# Patient Record
Sex: FEMALE | Race: BLACK OR AFRICAN AMERICAN | Employment: FULL TIME | RURAL
[De-identification: names, ages, dates, MRNs, and addresses within clinical notes are randomized per-mention and may not be internally consistent; named-entity substitution may affect disease eponyms.]

---

## 2017-07-10 ENCOUNTER — OFFICE VISIT (OUTPATIENT)
Dept: FAMILY MEDICINE CLINIC | Age: 57
End: 2017-07-10

## 2017-07-10 VITALS
BODY MASS INDEX: 29.74 KG/M2 | SYSTOLIC BLOOD PRESSURE: 138 MMHG | DIASTOLIC BLOOD PRESSURE: 78 MMHG | TEMPERATURE: 98.5 F | OXYGEN SATURATION: 99 % | HEART RATE: 94 BPM | WEIGHT: 160 LBS

## 2017-07-10 DIAGNOSIS — E78.00 HYPERCHOLESTEROLEMIA: ICD-10-CM

## 2017-07-10 DIAGNOSIS — Z12.11 SCREEN FOR COLON CANCER: ICD-10-CM

## 2017-07-10 DIAGNOSIS — Z00.00 ROUTINE GENERAL MEDICAL EXAMINATION AT A HEALTH CARE FACILITY: Primary | ICD-10-CM

## 2017-07-10 DIAGNOSIS — Z11.59 ENCOUNTER FOR HEPATITIS C SCREENING TEST FOR LOW RISK PATIENT: ICD-10-CM

## 2017-07-10 DIAGNOSIS — J30.9 ALLERGIC RHINITIS, UNSPECIFIED ALLERGIC RHINITIS TRIGGER, UNSPECIFIED RHINITIS SEASONALITY: ICD-10-CM

## 2017-07-10 DIAGNOSIS — Z12.31 ENCOUNTER FOR SCREENING MAMMOGRAM FOR BREAST CANCER: ICD-10-CM

## 2017-07-10 DIAGNOSIS — I10 ESSENTIAL HYPERTENSION WITH GOAL BLOOD PRESSURE LESS THAN 140/90: ICD-10-CM

## 2017-07-10 DIAGNOSIS — F41.9 ANXIETY: ICD-10-CM

## 2017-07-10 DIAGNOSIS — Z12.31 ENCOUNTER FOR MAMMOGRAM TO ESTABLISH BASELINE MAMMOGRAM: ICD-10-CM

## 2017-07-10 RX ORDER — ATORVASTATIN CALCIUM 20 MG/1
TABLET, FILM COATED ORAL
Qty: 90 TAB | Refills: 1 | Status: SHIPPED | OUTPATIENT
Start: 2017-07-10 | End: 2018-02-06 | Stop reason: SDUPTHER

## 2017-07-10 RX ORDER — FLUTICASONE PROPIONATE 50 MCG
2 SPRAY, SUSPENSION (ML) NASAL DAILY
Qty: 1 BOTTLE | Refills: 6 | Status: SHIPPED | OUTPATIENT
Start: 2017-07-10 | End: 2017-10-16

## 2017-07-10 RX ORDER — LOSARTAN POTASSIUM AND HYDROCHLOROTHIAZIDE 12.5; 5 MG/1; MG/1
TABLET ORAL
Qty: 90 TAB | Refills: 1 | Status: SHIPPED | OUTPATIENT
Start: 2017-07-10 | End: 2017-09-08 | Stop reason: SDUPTHER

## 2017-07-10 RX ORDER — LORAZEPAM 1 MG/1
TABLET ORAL
Qty: 60 TAB | Refills: 0 | Status: SHIPPED | OUTPATIENT
Start: 2017-07-10 | End: 2018-03-27 | Stop reason: SDUPTHER

## 2017-07-10 NOTE — MR AVS SNAPSHOT
Visit Information Date & Time Provider Department Dept. Phone Encounter #  
 7/10/2017  8:00 AM [facility] [phone] [id number] Follow-up Instructions Return in about 6 months (around 1/10/2018). Follow-up and Disposition History Upcoming Health Maintenance Date Due Hepatitis C Screening 1960 PAP AKA CERVICAL CYTOLOGY 10/18/2016 FOBT Q 1 YEAR AGE 50-75 8/9/2017 INFLUENZA AGE 9 TO ADULT 8/1/2017 BREAST CANCER SCRN MAMMOGRAM 9/15/2018 DTaP/Tdap/Td series (2 - Td) 3/3/2019 Allergies as of 7/10/2017  Review Complete On: 7/10/2017 By: Angel Meyer MD  
 No Known Allergies Current Immunizations  Never Reviewed Name Date Influenza Vaccine 1/8/2001 Tdap 3/3/2009 Not reviewed this visit You Were Diagnosed With   
  
 Codes Comments Routine general medical examination at a health care facility    -  Primary ICD-10-CM: Z00.00 ICD-9-CM: V70.0 Essential hypertension with goal blood pressure less than 140/90     ICD-10-CM: I10 
ICD-9-CM: 401.9 Hypercholesterolemia     ICD-10-CM: E78.00 ICD-9-CM: 272.0 Encounter for mammogram to establish baseline mammogram     ICD-10-CM: Z12.31 
ICD-9-CM: V76.12 Anxiety     ICD-10-CM: F41.9 ICD-9-CM: 300.00 Encounter for hepatitis C screening test for low risk patient     ICD-10-CM: Z11.59 
ICD-9-CM: V73.89 Screen for colon cancer     ICD-10-CM: Z12.11 ICD-9-CM: V76.51 Encounter for screening mammogram for breast cancer     ICD-10-CM: Z12.31 
ICD-9-CM: V76.12 Allergic rhinitis, unspecified allergic rhinitis trigger, unspecified rhinitis seasonality     ICD-10-CM: J30.9 ICD-9-CM: 477.9 Vitals BP Pulse Temp Weight(growth percentile) SpO2 BMI  
 138/78 94 98.5 °F (36.9 °C) 160 lb (72.6 kg) 99% 29.74 kg/m2 OB Status Smoking Status Postmenopausal Never Smoker BMI and BSA Data Body Mass Index Body Surface Area  
 29.74 kg/m 2 1.77 m 2 Preferred Pharmacy Pharmacy Name Phone [facility] #[id number] Ingram [facility] [address] [place] [phone] Your Updated Medication List  
  
   
This list is accurate as of: 7/10/17  8:55 AM.  Always use your most recent med list.  
  
  
  
  
 atorvastatin 20 mg tablet Commonly known as:  LIPITOR  
TAKE 1 TAB BY MOUTH DAILY. FISH OIL 1,000 mg Cap Generic drug:  omega-3 fatty acids-vitamin e Take 1 capsule by mouth. 1 qod  
  
 fluticasone 50 mcg/actuation nasal spray Commonly known as:  [employer] 2 Sprays by Both Nostrils route daily. LORazepam 1 mg tablet Commonly known as:  ATIVAN One daily if needed  
  
 losartan-hydroCHLOROthiazide 50-12.5 mg per tablet Commonly known as:  HYZAAR  
TAKE 1 TABLET BY MOUTH EVERY DAY Prescriptions Printed Refills LORazepam (ATIVAN) 1 mg tablet 0 Sig: One daily if needed Class: Print Prescriptions Sent to Pharmacy Refills  
 atorvastatin (LIPITOR) 20 mg tablet 1 Sig: TAKE 1 TAB BY MOUTH DAILY. Class: Normal  
 Pharmacy: [facility]/pharmacy #[id number] - Saniya Cordobas - [address] Ph #: [phone]  
 fluticasone (FLONASE) 50 mcg/actuation nasal spray 6 Si Sprays by Both Nostrils route daily. Class: Normal  
 Pharmacy: [facility]/pharmacy #[id number] Mani ConcepcionSaumya [facility] [address] [place] Ph #: [phone] Route: Both Nostrils  
 losartan-hydroCHLOROthiazide (HYZAAR) 50-12.5 mg per tablet 1 Sig: TAKE 1 TABLET BY MOUTH EVERY DAY Class: Normal  
 Pharmacy: [facility]/pharmacy #[id number] Mani ConcepcionSaumya [facility] [address] Ph #: [phone] We Performed the Following ALT B101967 CPT(R)] AST [id number] CPT(R)] CBC WITH AUTOMATED DIFF [50394 CPT(R)] HEPATITIS C AB [33373 CPT(R)] LIPID PANEL [46836 CPT(R)] METABOLIC PANEL, BASIC [94153 CPT(R)] PAP LB, HPV-H+LR([id number]) [[id number] Custom] URINALYSIS W/ RFLX MICROSCOPIC [05652 CPT(R)] Follow-up Instructions Return in about 6 months (around 1/10/2018). To-Do List   
 07/10/2017 Lab:  OCCULT BLOOD, IMMUNOASSAY (FIT) Around 09/18/2017 Imaging:  ETTA MAMMO BI SCREENING INCL CAD Introducing [facility] & HEALTH SERVICES! Edou[facility] Kaplan introduces [email or web address] patient portal. Now you can access parts of your medical record, email your doctor's office, and request medication refills online. 1. In your internet browser, go to [email or web address] 2. Click on the First Time User? Click Here link in the Sign In box. You will see the New Member Sign Up page. 3. Enter your [email or web address] Access Code exactly as it appears below. You will not need to use this code after youve completed the sign-up process. If you do not sign up before the expiration date, you must request a new code. · [email or web address] Access Code: [email or web address] Expires: 10/8/2017  8:30 AM 
 
4. Enter the last four digits of your Social Security Number (xxxx) and Date of Birth (mm/dd/yyyy) as indicated and click Submit. You will be taken to the next sign-up page. 5. Create a [email or web address] ID. This will be your [email or web address] login ID and cannot be changed, so think of one that is secure and easy to remember. 6. Create a [email or web address] password. You can change your password at any time. 7. Enter your Password Reset Question and Answer. This can be used at a later time if you forget your password. 8. Enter your e-mail address. You will receive e-mail notification when new information is available in [address] 9. Click Sign Up. You can now view and download portions of your medical record. 10. Click the Download Summary menu link to download a portable copy of your medical information. If you have questions, please visit the Frequently Asked Questions section of the [email or web address] website.  Remember, [email or web address] is NOT to be used for urgent needs. For medical emergencies, dial 911. Now available from your iPhone and Android! Please provide this summary of care documentation to your next provider. Your primary care clinician is listed as Genesis Garcia. If you have any questions after today's visit, please call [phone].

## 2017-07-10 NOTE — PROGRESS NOTES
Daisey Oppenheim is a 59 y.o. female who presents to the office today with the following:  Chief Complaint   Patient presents with    Annual Wellness Visit       No Known Allergies    Current Outpatient Prescriptions   Medication Sig    atorvastatin (LIPITOR) 20 mg tablet TAKE 1 TAB BY MOUTH DAILY.  fluticasone (FLONASE) 50 mcg/actuation nasal spray 2 Sprays by Both Nostrils route daily.  losartan-hydroCHLOROthiazide (HYZAAR) 50-12.5 mg per tablet TAKE 1 TABLET BY MOUTH EVERY DAY    LORazepam (ATIVAN) 1 mg tablet One daily if needed    omega-3 fatty acids-vitamin e (FISH OIL) 1,000 mg cap Take 1 capsule by mouth. 1 qod     No current facility-administered medications for this visit. Past Medical History:   Diagnosis Date    Allergic rhinitis     Anemia     blood loss    Hypercholesterolemia     Hypertension     Irregular menses        Past Surgical History:   Procedure Laterality Date    HX  SECTION      HX TUBAL LIGATION         History   Smoking Status    Never Smoker   Smokeless Tobacco    Never Used       Family History   Problem Relation Age of Onset    Hypertension Mother     Diabetes Mother    [facility] [address] Stroke Father     Hypertension Father     Diabetes Father     Arrhythmia Father      A fib    Hypertension Sister     Hypertension Brother     Diabetes Brother     Diabetes Maternal Grandmother     Cancer Paternal Grandmother      Lung--smoker         History of Present Illness:  Patient here for routine annual wellness visit, Pap smear and ongoing medical follow-up    Patient is  3 para 1 female. Postmenopausal without bleeding or spotting. Previous Pap smears normal.  Previous GYN history negative. No current GYN complaints. Here for routine breast exam.  No breast complaints. Last mammogram 2016. She does get mammograms annually. No first-degree relatives with breast cancer    Patient has hypertension. Controlled on her current regiment.   No cardiac complaints. Most recent EKG normal.  Most recent basic metabolic profile normal    Patient with hyperlipidemia. Well controlled on current regimen. Most recent cholesterol 160 with normal LFTs. Repeat lab work ordered. Patient does have a history of anxiety. She has been on as needed lorazepam for a number of years. Last year Celexa was added in. She is opted not to take that medication. Overall her symptoms have improved. She uses the Ativan only rarely. She did receive 60 tablets in November 2016 and is just now asking for a refill.  was reviewed. She is not feeling depressed. Patient with allergic rhinitis. Uses as needed Flonase. This was refilled. Mild increase in BMI. We discussed the importance of diet and exercise. Patient does not do colonoscopies. Fit test -2016. Reordered. No family history of colon issues. Patient to be screened for hepatitis C    She is declining flu shot and Pneumovax    Review of Systems:  Review of systems negative except as noted above        Physical Exam:  Visit Vitals    /78    Pulse 94    Temp 98.5 °F (36.9 °C)    Wt 160 lb (72.6 kg)    SpO2 99%    BMI 29.74 kg/m2     Vitals:    07/10/17 0849   BP: 138/78   Pulse: 94   Temp: 98.5 °F (36.9 °C)   SpO2: 99%   Weight: 160 lb (72.6 kg)    Patient no acute distress vital signs stable as above  Head is normocephalic  Affect. Patient seems somewhat nervous but not really anxious. Good eye contact. External ears normal.  Hearing grossly normal  Eyes PERRLA. EOMs full. Sclera clear. Patient does see her eye doctor regularly  Nose within normal limits. Nares  normal.  No significant congestion  OP mucosa normal, no obvious lesions. Pharynx normal.  No erythema or exudate. Structures midline. .  Patient does follow with her dentist regularly  Neck no nodes no masses no bruits  Chest was clear no wheezes rhonchi or rales.   Good air exchange  Cor regular rate and rhythm no S3-S4 no murmurs  Breast exam: No masses dimpling or discharge  Abdomen no HSM no masses soft and was nontender  Extremities no edema. Nontender. Good range of motion  GYN exam: Normal external female genitalia  Vaginal exam was normal without discharge or lesions  Cervix appeared normal no lesions or bleeding  Bimanual exam was normal  Adnexa normal  Rectovaginal exam normal  Gait and gross neurologic exam were normal      1. Routine general medical examination at a health care facility  Healthy-appearing female. Overall medically stable. She is can continue her current medications. Tests as noted below. Medications refilled. She will be back in 6 months for follow-up sooner if needed  - PAP LB, HPV-H+LR([id number])    2. Essential hypertension with goal blood pressure less than 140/90    - losartan-hydroCHLOROthiazide (HYZAAR) 50-12.5 mg per tablet; TAKE 1 TABLET BY MOUTH EVERY DAY  Dispense: 90 Tab; Refill: 1  - CBC WITH AUTOMATED DIFF  - URINALYSIS W/ RFLX MICROSCOPIC  - METABOLIC PANEL, BASIC    3. Hypercholesterolemia    - atorvastatin (LIPITOR) 20 mg tablet; TAKE 1 TAB BY MOUTH DAILY. Dispense: 90 Tab; Refill: 1  - LIPID PANEL  - AST  - ALT      5. Anxiety    - LORazepam (ATIVAN) 1 mg tablet; One daily if needed  Dispense: 60 Tab; Refill: 0    6. Encounter for hepatitis C screening test for low risk patient    - HEPATITIS C AB    7. Screen for colon cancer    - OCCULT BLOOD, IMMUNOASSAY (FIT); Future    8. Encounter for screening mammogram for breast cancer    - ETTA MAMMO BI SCREENING INCL CAD; Future    9. Allergic rhinitis, unspecified allergic rhinitis trigger, unspecified rhinitis seasonality    - fluticasone (FLONASE) 50 mcg/actuation nasal spray; 2 Sprays by Both Nostrils route daily. Dispense: 1 Bottle; Refill: 6      Continue current therapy plan except for indicated above.  Verbal and written instructions (see AVS) provided.  Patient expresses understanding of diagnosis and treatment plan.    Follow-up Disposition:  Return in about 6 months (around 1/10/2018). Nik Perry.  Cameron Fabian MD

## 2017-07-11 LAB
ALT SERPL-CCNC: 12 IU/L (ref 0–32)
APPEARANCE UR: CLEAR
AST SERPL-CCNC: 11 IU/L (ref 0–40)
BACTERIA #/AREA URNS HPF: NORMAL /[HPF]
BASOPHILS # BLD AUTO: 0 X10E3/UL (ref 0–0.2)
BASOPHILS NFR BLD AUTO: 1 %
BILIRUB UR QL STRIP: NEGATIVE
BUN SERPL-MCNC: 11 MG/DL (ref 6–24)
BUN/CREAT SERPL: 17 (ref 9–23)
CALCIUM SERPL-MCNC: 9.6 MG/DL (ref 8.7–10.2)
CASTS URNS QL MICRO: NORMAL /LPF
CHLORIDE SERPL-SCNC: 99 MMOL/L (ref 96–106)
CHOLEST SERPL-MCNC: 190 MG/DL (ref 100–199)
CO2 SERPL-SCNC: 22 MMOL/L (ref 18–29)
COLOR UR: YELLOW
CREAT SERPL-MCNC: 0.66 MG/DL (ref 0.57–1)
EOSINOPHIL # BLD AUTO: 0 X10E3/UL (ref 0–0.4)
EOSINOPHIL NFR BLD AUTO: 1 %
EPI CELLS #/AREA URNS HPF: NORMAL /HPF
ERYTHROCYTE [DISTWIDTH] IN BLOOD BY AUTOMATED COUNT: 14.4 % (ref 12.3–15.4)
GLUCOSE SERPL-MCNC: 86 MG/DL (ref 65–99)
GLUCOSE UR QL: NEGATIVE
HCT VFR BLD AUTO: 38.5 % (ref 34–46.6)
HCV AB S/CO SERPL IA: <0.1 S/CO RATIO (ref 0–0.9)
HDLC SERPL-MCNC: 62 MG/DL
HGB BLD-MCNC: 12.4 G/DL (ref 11.1–15.9)
HGB UR QL STRIP: NEGATIVE
IMM GRANULOCYTES # BLD: 0 X10E3/UL (ref 0–0.1)
IMM GRANULOCYTES NFR BLD: 0 %
KETONES UR QL STRIP: NEGATIVE
LDLC SERPL CALC-MCNC: 111 MG/DL (ref 0–99)
LEUKOCYTE ESTERASE UR QL STRIP: ABNORMAL
LYMPHOCYTES # BLD AUTO: 1.5 X10E3/UL (ref 0.7–3.1)
LYMPHOCYTES NFR BLD AUTO: 37 %
MCH RBC QN AUTO: 25.2 PG (ref 26.6–33)
MCHC RBC AUTO-ENTMCNC: 32.2 G/DL (ref 31.5–35.7)
MCV RBC AUTO: 78 FL (ref 79–97)
MICRO URNS: ABNORMAL
MONOCYTES # BLD AUTO: 0.5 X10E3/UL (ref 0.1–0.9)
MONOCYTES NFR BLD AUTO: 12 %
MUCOUS THREADS URNS QL MICRO: PRESENT
NEUTROPHILS # BLD AUTO: 2.1 X10E3/UL (ref 1.4–7)
NEUTROPHILS NFR BLD AUTO: 49 %
NITRITE UR QL STRIP: NEGATIVE
PH UR STRIP: 7 [PH] (ref 5–7.5)
PLATELET # BLD AUTO: 235 X10E3/UL (ref 150–379)
POTASSIUM SERPL-SCNC: 4.1 MMOL/L (ref 3.5–5.2)
PROT UR QL STRIP: NEGATIVE
RBC # BLD AUTO: 4.92 X10E6/UL (ref 3.77–5.28)
RBC #/AREA URNS HPF: NORMAL /HPF
SODIUM SERPL-SCNC: 144 MMOL/L (ref 134–144)
SP GR UR: 1.02 (ref 1–1.03)
TRIGL SERPL-MCNC: 86 MG/DL (ref 0–149)
UROBILINOGEN UR STRIP-MCNC: 1 MG/DL (ref 0.2–1)
VLDLC SERPL CALC-MCNC: 17 MG/DL (ref 5–40)
WBC # BLD AUTO: 4.2 X10E3/UL (ref 3.4–10.8)
WBC #/AREA URNS HPF: NORMAL /HPF

## 2017-07-11 NOTE — PROGRESS NOTES
Recent lab work reviewed  All lab tests look good, within acceptable limits except cholesterol a bit higher at 190  Patient should continue the current medications  Work harder on her low-cholesterol diet    Keep planned follow-up

## 2017-07-14 LAB
CYTOLOGIST CVX/VAG CYTO: NORMAL
DX ICD CODE: NORMAL
HPV I/H RISK 1 DNA CVX QL PROBE+SIG AMP: NEGATIVE
HPV LOW RISK DNA CVX QL PROBE+SIG AMP: NEGATIVE
Lab: NORMAL
OTHER STN SPEC: NORMAL
PATH REPORT.FINAL DX SPEC: NORMAL
STAT OF ADQ CVX/VAG CYTO-IMP: NORMAL

## 2017-07-17 NOTE — PROGRESS NOTES
Patient's Pap smear came back normal  She should continue her current medications and keep her planned follow-up

## 2017-09-08 DIAGNOSIS — I10 ESSENTIAL HYPERTENSION WITH GOAL BLOOD PRESSURE LESS THAN 140/90: ICD-10-CM

## 2017-09-08 RX ORDER — LOSARTAN POTASSIUM AND HYDROCHLOROTHIAZIDE 12.5; 5 MG/1; MG/1
TABLET ORAL
Qty: 90 TAB | Refills: 2 | OUTPATIENT
Start: 2017-09-08 | End: 2019-07-30

## 2017-09-08 NOTE — TELEPHONE ENCOUNTER
There is no request for a BP medication for this patient. Please refill generic Hyzaar. Next F/U per Dr. Karli Breaux isn't until January 2018.

## 2017-09-08 NOTE — TELEPHONE ENCOUNTER
----- Message from Ilia Storm sent at 9/7/2017  5:08 PM EDT -----  Regarding: Dr Mcclure/rx refill  Pt (p) [phone], pt said she is at [address] [facility] in [place], and her rx for blood pressure medication has not been called in yet , after being told that it would. She is requesting that it please be called in by tomorrow.

## 2017-10-16 ENCOUNTER — TELEPHONE (OUTPATIENT)
Dept: FAMILY MEDICINE CLINIC | Age: 57
End: 2017-10-16

## 2017-10-16 ENCOUNTER — OFFICE VISIT (OUTPATIENT)
Dept: FAMILY MEDICINE CLINIC | Age: 57
End: 2017-10-16

## 2017-10-16 VITALS
HEART RATE: 102 BPM | HEIGHT: 62 IN | BODY MASS INDEX: 30 KG/M2 | TEMPERATURE: 97.7 F | WEIGHT: 163 LBS | DIASTOLIC BLOOD PRESSURE: 83 MMHG | RESPIRATION RATE: 16 BRPM | SYSTOLIC BLOOD PRESSURE: 179 MMHG | OXYGEN SATURATION: 96 %

## 2017-10-16 DIAGNOSIS — L02.419 ABSCESS OF UPPER ARM: Primary | ICD-10-CM

## 2017-10-16 DIAGNOSIS — I10 ESSENTIAL HYPERTENSION WITH GOAL BLOOD PRESSURE LESS THAN 140/90: ICD-10-CM

## 2017-10-16 RX ORDER — CEPHALEXIN 500 MG/1
500 CAPSULE ORAL 2 TIMES DAILY
Qty: 20 CAP | Refills: 0 | Status: SHIPPED | OUTPATIENT
Start: 2017-10-16 | End: 2017-10-18

## 2017-10-16 NOTE — TELEPHONE ENCOUNTER
WALK IN. Patient would like to be seen by Dr. Rick Lira for a painful cyst on her Lt shoulder that is oozing.

## 2017-10-16 NOTE — PROGRESS NOTES
Harrison Gill is a 59 y.o. female who presents to the office today with the following:  Chief Complaint   Patient presents with    Cyst     left shoulder       No Known Allergies    Current Outpatient Prescriptions   Medication Sig    losartan-hydroCHLOROthiazide (HYZAAR) 50-12.5 mg per tablet TAKE 1 TABLET BY MOUTH EVERY DAY    atorvastatin (LIPITOR) 20 mg tablet TAKE 1 TAB BY MOUTH DAILY.  LORazepam (ATIVAN) 1 mg tablet One daily if needed    omega-3 fatty acids-vitamin e (FISH OIL) 1,000 mg cap Take 1 capsule by mouth. 1 qod     No current facility-administered medications for this visit. Past Medical History:   Diagnosis Date    Allergic rhinitis     Anemia     blood loss    Hypercholesterolemia     Hypertension     Irregular menses        Past Surgical History:   Procedure Laterality Date    HX  SECTION      HX TUBAL LIGATION         History   Smoking Status    Never Smoker   Smokeless Tobacco    Never Used       Family History   Problem Relation Age of Onset    Hypertension Mother     Diabetes Mother    [facility] Stroke Father     Hypertension Father     Diabetes Father     Arrhythmia Father      A fib    Hypertension Sister     Hypertension Brother     Diabetes Brother     Diabetes Maternal Grandmother     Cancer Paternal Grandmother      Lung--smoker    Breast Cancer Maternal Aunt          History of Present Illness:  Patient here for abscess left shoulder area    Patient with a history of small sebaceous cyst left shoulder area. This did become infected several years ago and had to be incised and drained. Patient did note some recurrence of a lump in that area over the last year. She was contemplating following up for surgical excision but did not. Over the last week this is become inflamed tender and larger. She has been getting some scant drainage from it. She is here to have this opened and drained    Patient does have a history of hypertension.   Blood pressure has been in good control. Blood pressure up today but patient admits she is in pain and very scared about the procedure    No history of valvular heart disease or heart murmur      Review of Systems:  Review of systems negative except as noted above        Physical Exam:  Visit Vitals    /83 (BP 1 Location: Right arm, BP Patient Position: Sitting)    Pulse (!) 102    Temp 97.7 °F (36.5 °C) (Temporal)    Resp 16    Ht 5' 1.5\" (1.562 m)    Wt 163 lb (73.9 kg)    SpO2 96%    BMI 30.3 kg/m2     Vitals:    10/16/17 1248   BP: 179/83   BP 1 Location: Right arm   BP Patient Position: Sitting   Pulse: (!) 102   Resp: 16   Temp: 97.7 °F (36.5 °C)   TempSrc: Temporal   SpO2: 96%   Weight: 163 lb (73.9 kg)   Height: 5' 1.5\" (1.562 m)   Patient no acute distress vital signs as above  Chest was clear  Cor regular rate and rhythm no murmurs  Left shoulder anterior aspect patient had a 2 cm inflamed abscess which was flocculent. Tender to touch    Office course:   risks and benefits of I&D discussed with patient she wishes to proceed  Area was cleaned with Betadine  Anesthetized with 2% Xylocaine and ethyl chloride spray  Opened with a blade  Culture obtained  Moderate amount of pus and caseous material was expressed  Non-iodoform gauze packing placed  Sterile dressing was applied    Patient tolerated the procedure well    Repeat blood pressure 170/90 (pt still anxious)        Assessment/Plan:  1. Abscess of upper arm  I&D as above. culture pending. Will place patient on Keflex  Patient return in 2 days for recheck and packing removal  Sooner if needed    2. Essential hypertension with goal blood pressure less than 140/90  Historically controlled on current regimen. Up today secondary to pain and anxiety  We will recheck Wednesday          Continue current therapy plan except for indicated above.  Verbal and written instructions (see AVS) provided.  Patient expresses understanding of diagnosis and treatment plan.    Follow-up Disposition: Not on [address] [place] [address].  Glendy Pretty MD

## 2017-10-16 NOTE — PATIENT INSTRUCTIONS
Keep area clean and dry  Change dressing  Keep planned follow-up for recheck sooner if not improving  Finish antibiotics

## 2017-10-16 NOTE — MR AVS SNAPSHOT
Visit Information Date & Time Provider Department Dept. Phone Encounter #  
 10/16/2017  1:00 PM Darrell Coolye MD [facility] 86 [id number] Follow-up Instructions Return in about 2 days (around 10/18/2017) for with Osker Promise follow up le. Upcoming Health Maintenance Date Due INFLUENZA AGE 9 TO ADULT 8/1/2017 FOBT Q 1 YEAR AGE 50-75 8/9/2017 DTaP/Tdap/Td series (2 - Td) 3/3/2019 BREAST CANCER SCRN MAMMOGRAM 9/12/2019 PAP AKA CERVICAL CYTOLOGY 7/10/2020 Allergies as of 10/16/2017  Review Complete On: 10/16/2017 By: Darrell Cooley MD  
 No Known Allergies Current Immunizations  Never Reviewed Name Date Influenza Vaccine 1/8/2001 Tdap 3/3/2009 Not reviewed this visit You Were Diagnosed With   
  
 Codes Comments Abscess of upper arm    -  Primary ICD-10-CM: L02.419 ICD-9-CM: 682.3 Essential hypertension with goal blood pressure less than 140/90     ICD-10-CM: I10 
ICD-9-CM: 401.9 Vitals BP Pulse Temp Resp Height(growth percentile) 179/83 (BP 1 Location: Right arm, BP Patient Position: Sitting) (!) 102 97.7 °F (36.5 °C) (Temporal) 16 5' 1.5\" (1.562 m) Weight(growth percentile) SpO2 BMI OB Status Smoking Status 163 lb (73.9 kg) 96% 30.3 kg/m2 Postmenopausal Never Smoker BMI and BSA Data Body Mass Index Body Surface Area  
 30.3 kg/m 2 1.79 m 2 Preferred Pharmacy Pharmacy Name Phone [id number] Sa[address] [phone] Your Updated Medication List  
  
   
This list is accurate as of: 10/16/17  1:35 PM.  Always use your most recent med list.  
  
  
  
  
 atorvastatin 20 mg tablet Commonly known as:  LIPITOR  
TAKE 1 TAB BY MOUTH DAILY. cephALEXin 500 mg capsule Commonly known as:  [place]in [place] Take 1 Cap by mouth two (2) times a day for 10 days. FISH OIL 1,000 mg Cap Generic drug:  omega-3 fatty acids-vitamin e Take 1 capsule by mouth. 1 qod LORazepam 1 mg tablet Commonly known as:  ATIVAN One daily if needed  
  
 losartan-hydroCHLOROthiazide 50-12.5 mg per tablet Commonly known as:  HYZAAR  
TAKE 1 TABLET BY MOUTH EVERY DAY Prescriptions Sent to Pharmacy Refills  
 cephALEXin (KEFLEX) 500 mg capsule 0 Sig: Take 1 Cap by mouth two (2) times a day for 10 days. Class: Normal  
 Pharmacy: [facility]/pharmacy #[id number] Gil Kilpatrick, [address] [place] [address] [facility] #: [phone] Route: Oral  
  
We Performed the Following DRAIN SKIN ABSCESS SIMPLE [90229 CPT(R)] Follow-up Instructions Return in about 2 days (around 10/18/2017) for with Medardo Younger follow up abcess. Patient Instructions Keep area clean and dry Change dressing Keep planned follow-up for recheck sooner if not improving Finish antibiotics Introducing [facility] & HEALTH SERVICES! [facility] introduces iLumen patient portal. Now you can access parts of your medical record, email your doctor's office, and request medication refills online. 1. In your internet browser, go to [email or web address] 2. Click on the First Time User? Click Here link in the Sign In box. You will see the New Member Sign Up page. 3. Enter your iLumen Access Code exactly as it appears below. You will not need to use this code after youve completed the sign-up process. If you do not sign up before the expiration date, you must request a new code. · i[employer]umen Access Code: [email or web address] Expires: 1/14/2018  1:30 PM 
 
4. Enter the last four digits of your Social Security Number (xxxx) and Date of Birth (mm/dd/yyyy) as indicated and click Submit. You will be taken to the next sign-up page. 5. Create a iLumen ID. This will be your [employer] login ID and cannot be changed, so think of one that is secure and easy to remember. 6. Create a FreePriceAlerts password. You can change your password at any time. 7. Enter your Password Reset Question and Answer. This can be used at a later time if you forget your password. 8. Enter your e-mail address. You will receive e-mail notification when new information is available in [address] 9. Click Sign Up. You can now view and download portions of your medical record. 10. Click the Download Summary menu link to download a portable copy of your medical information. If you have questions, please visit the Frequently Asked Questions section of the Free[employer]Alerts website. Remember, FreePriceAlerts is NOT to be used for urgent needs. For medical emergencies, dial 911. Now available from your iPhone and Android! Please provide this summary of care documentation to your next provider. Your primary care clinician is listed as NONE. If you have any questions after today's visit, please call [phone].

## 2017-10-18 ENCOUNTER — OFFICE VISIT (OUTPATIENT)
Dept: FAMILY MEDICINE CLINIC | Age: 57
End: 2017-10-18

## 2017-10-18 VITALS
RESPIRATION RATE: 16 BRPM | WEIGHT: 164 LBS | DIASTOLIC BLOOD PRESSURE: 80 MMHG | TEMPERATURE: 97.5 F | HEIGHT: 65 IN | HEART RATE: 77 BPM | BODY MASS INDEX: 27.32 KG/M2 | SYSTOLIC BLOOD PRESSURE: 130 MMHG | OXYGEN SATURATION: 99 %

## 2017-10-18 DIAGNOSIS — Z51.89 ENCOUNTER FOR WOUND RE-CHECK: Primary | ICD-10-CM

## 2017-10-18 RX ORDER — CIPROFLOXACIN 500 MG/1
500 TABLET ORAL 2 TIMES DAILY
Qty: 20 TAB | Refills: 0 | Status: SHIPPED | OUTPATIENT
Start: 2017-10-18 | End: 2017-10-28

## 2017-10-18 NOTE — PROGRESS NOTES
Subjective:     Giorgi Sheikh is a 59 y.o. female who presents today with the following:  Chief Complaint   Patient presents with    Wound Check   Seen on Monday by Dr. Shante Drummond. Sebacceous cyst I&D with wick placement. Mila Clemens presents today for wound check follow up. COMPLIANT WITH MEDICATION:         ROS:  Gen: denies fever, chills, fatigue, weight loss, weight gain  HEENT:denies blurry vision, nasal congestion, sore throat  Resp: denies dypsnea, cough, wheezing  CV: denies chest pain radiating to the jaws or arms, palpitations, lower extremity edema      No Known Allergies      Current Outpatient Prescriptions:     ciprofloxacin HCl (CIPRO) 500 mg tablet, Take 1 Tab by mouth two (2) times a day for 10 days. , Disp: 20 Tab, Rfl: 0    losartan-hydroCHLOROthiazide (HYZAAR) 50-12.5 mg per tablet, TAKE 1 TABLET BY MOUTH EVERY DAY, Disp: 90 Tab, Rfl: 2    atorvastatin (LIPITOR) 20 mg tablet, TAKE 1 TAB BY MOUTH DAILY. , Disp: 90 Tab, Rfl: 1    LORazepam (ATIVAN) 1 mg tablet, One daily if needed, Disp: 60 Tab, Rfl: 0    omega-3 fatty acids-vitamin e (FISH OIL) 1,000 mg cap, Take 1 capsule by mouth.  1 qod, Disp: , Rfl:     Past Medical History:   Diagnosis Date    Allergic rhinitis     Anemia     blood loss    Hypercholesterolemia     Hypertension     Irregular menses        Past Surgical History:   Procedure Laterality Date    HX  SECTION      HX TUBAL LIGATION         History   Smoking Status    Never Smoker   Smokeless Tobacco    Never Used       Social History     Social History    Marital status: SINGLE     Spouse name: N/A    Number of children: N/A    Years of education: N/A     Social History Main Topics    Smoking status: Never Smoker    Smokeless tobacco: Never Used    Alcohol use No    Drug use: No    Sexual activity: Not Asked     Other Topics Concern    None     Social History Narrative       Family History   Problem Relation Age of Onset    Hypertension Mother    [facility] Diabetes Mother     Stroke Father     Hypertension Father     Diabetes Father     Arrhythmia Father      A fib    Hypertension Sister     Hypertension Brother     Diabetes Brother     Diabetes Maternal Grandmother     Cancer Paternal Grandmother      Lung--smoker    Breast Cancer Maternal Aunt          Objective:     Visit Vitals    /80 (BP 1 Location: Right arm, BP Patient Position: Sitting)    Pulse 77    Temp 97.5 °F (36.4 °C) (Temporal)    Resp 16    Ht 5' 5.1\" (1.654 m)    Wt 164 lb (74.4 kg)    SpO2 99%    BMI 27.21 kg/m2     Body mass index is 27.21 kg/(m^2). General: Alert and oriented. No acute distress. Well nourished  Chest was clear   HR: RRR  Left shoulder 1 cm wound mild tenderness to touch. Bandage removed . Packing with purulent drainage removed. Area cleansed with antiseptic prep and saline. Using sterile technique purulent drainage with core gently  expressed from wound. Wound cleansed and dressed with antibiotic ointment. Patient tolerated procedure without complications and stated the spot felt much better. Results for orders placed or performed in visit on 07/10/17   CBC WITH AUTOMATED DIFF   Result Value Ref Range    WBC 4.2 3.4 - 10.8 x10E3/uL    RBC 4.92 3.77 - 5.28 x10E6/uL    HGB 12.4 11.1 - 15.9 g/dL    HCT 38.5 34.0 - 46.6 %    MCV 78 (L) 79 - 97 fL    MCH 25.2 (L) 26.6 - 33.0 pg    MCHC 32.2 31.5 - 35.7 g/dL    RDW 14.4 12.3 - 15.4 %    PLATELET 129 685 - 936 x10E3/uL    NEUTROPHILS 49 %    Lymphocytes 37 %    MONOCYTES 12 %    EOSINOPHILS 1 %    BASOPHILS 1 %    ABS. NEUTROPHILS 2.1 1.4 - 7.0 x10E3/uL    Abs Lymphocytes 1.5 0.7 - 3.1 x10E3/uL    ABS. MONOCYTES 0.5 0.1 - 0.9 x10E3/uL    ABS. EOSINOPHILS 0.0 0.0 - 0.4 x10E3/uL    ABS. BASOPHILS 0.0 0.0 - 0.2 x10E3/uL    IMMATURE GRANULOCYTES 0 %    ABS. IMM.  GRANS. 0.0 0.0 - 0.1 x10E3/uL   URINALYSIS W/ RFLX MICROSCOPIC   Result Value Ref Range    Specific Gravity 1.018 1.005 - 1.030    pH (UA) 7.0 5.0 - 7.5    Color Yellow Yellow    Appearance Clear Clear    Leukocyte Esterase Trace (A) Negative    Protein Negative Negative/Trace    Glucose Negative Negative    Ketone Negative Negative    Blood Negative Negative    Bilirubin Negative Negative    Urobilinogen 1.0 0.2 - 1.0 mg/dL    Nitrites Negative Negative    Microscopic Examination See additional order    LIPID PANEL   Result Value Ref Range    Cholesterol, total 190 100 - 199 mg/dL    Triglyceride 86 0 - 149 mg/dL    HDL Cholesterol 62 >39 mg/dL    VLDL, calculated 17 5 - 40 mg/dL    LDL, calculated 111 (H) 0 - 99 mg/dL   METABOLIC PANEL, BASIC   Result Value Ref Range    Glucose 86 65 - 99 mg/dL    BUN 11 6 - 24 mg/dL    Creatinine 0.66 0.57 - 1.00 mg/dL    GFR est non-AA 97 >59 mL/min/1.73    GFR est  >59 mL/min/1.73    BUN/Creatinine ratio 17 9 - 23    Sodium 144 134 - 144 mmol/L    Potassium 4.1 3.5 - 5.2 mmol/L    Chloride 99 96 - 106 mmol/L    CO2 22 18 - 29 mmol/L    Calcium 9.6 8.7 - 10.2 mg/dL   AST   Result Value Ref Range    AST (SGOT) 11 0 - 40 IU/L   ALT   Result Value Ref Range    ALT (SGPT) 12 0 - 32 IU/L   HEPATITIS C AB   Result Value Ref Range    Hep C Virus Ab <0.1 0.0 - 0.9 s/co ratio   MICROSCOPIC EXAMINATION   Result Value Ref Range    WBC 0-5 0 - 5 /hpf    RBC 0-2 0 - 2 /hpf    Epithelial cells 0-10 0 - 10 /hpf    Casts None seen None seen /lpf    Mucus Present Not Estab. Bacteria Few None seen/Few   PAP LB, HPV-H+LR(568585)   Result Value Ref Range    Diagnosis Comment     Specimen adequacy Comment     Clinician provided ICD10 Comment     Performed by: Comment     . Mateo Rollins Note: Comment     HPV, high-risk Negative Negative    HPV, low-risk Negative Negative       No results found for this visit on 10/18/17. Assessment/ Plan:     Diagnoses and all orders for this visit:    1. Encounter for wound re-check    Other orders  -     ciprofloxacin HCl (CIPRO) 500 mg tablet; Take 1 Tab by mouth two (2) times a day for 10 days. Reviewed results of culture with patient. Reviewed wound care . Patient responded appropriately with teachback. 1. Encounter for wound re-check        Orders Placed This Encounter    ciprofloxacin HCl (CIPRO) 500 mg tablet     Sig: Take 1 Tab by mouth two (2) times a day for 10 days. Dispense:  20 Tab     Refill:  0         Verbal and written instructions (see AVS) provided.  Patient expresses understanding of diagnosis and treatment plan. Follow-up Disposition:  Return if symptoms worsen or fail to improve.       Jackie Strange, ABDIFATAHC

## 2017-10-18 NOTE — MR AVS SNAPSHOT
Visit Information Date & Time Provider Department Dept. Phone Encounter #  
 10/18/2017  9:00 AM Yovany Hooper NP [address] [place] [address] [phone] [id number] Upcoming Health Maintenance Date Due INFLUENZA AGE 9 TO ADULT 8/1/2017 FOBT Q 1 YEAR AGE 50-75 8/9/2017 DTaP/Tdap/Td series (2 - Td) 3/3/2019 BREAST CANCER SCRN MAMMOGRAM 9/12/2019 PAP AKA CERVICAL CYTOLOGY 7/10/2020 Allergies as of 10/18/2017  Review Complete On: 10/18/2017 By: Yovany Hooper NP No Known Allergies Current Immunizations  Never Reviewed Name Date Influenza Vaccine 1/8/2001 Tdap 3/3/2009 Not reviewed this visit Vitals BP Pulse Temp Resp Height(growth percentile) 130/80 (BP 1 Location: Right arm, BP Patient Position: Sitting) 77 97.5 °F (36.4 °C) (Temporal) 16 5' 5.1\" (1.654 m) Weight(growth percentile) SpO2 BMI OB Status Smoking Status 164 lb (74.4 kg) 99% 27.21 kg/m2 Postmenopausal Never Smoker BMI and BSA Data Body Mass Index Body Surface Area  
 27.21 kg/m 2 1.85 m 2 Preferred Pharmacy Pharmacy Name Phone [id number] [address] [phone] Your Updated Medication List  
  
   
This list is accurate as of: 10/18/17  9:28 AM.  Always use your most recent med list.  
  
  
  
  
 atorvastatin 20 mg tablet Commonly known as:  LIPITOR  
TAKE 1 TAB BY MOUTH DAILY. ciprofloxacin HCl 500 mg tablet Commonly known as:  CIPRO Take 1 Tab by mouth two (2) times a day for 10 days. FISH OIL 1,000 mg Cap Generic drug:  omega-3 fatty acids-vitamin e Take 1 capsule by mouth. 1 qod LORazepam 1 mg tablet Commonly known as:  ATIVAN One daily if needed  
  
 losartan-hydroCHLOROthiazide 50-12.5 mg per tablet Commonly known as:  HYZAAR  
TAKE 1 TABLET BY MOUTH EVERY DAY Prescriptions Sent to Pharmacy Refills ciprofloxacin HCl (CIPRO) 500 mg tablet 0 Sig: Take 1 Tab by mouth two (2) times a day for 10 days. Class: Normal  
 Pharmacy: [id number] [address]  #: [phone] Route: Oral  
  
Introducing [facility]! [facility] introduces "[employer]" patient portal. Now you can access parts of your medical record, email your doctor's office, and request medication refills online. 1. In your internet browser, go to [email or web address] ([email or web address] (Closed) 2. Click on the First Time User? Click Here link in the Sign In box. You will see the New Member Sign Up page. 3. Enter your "[employer]" Access Code exactly as it appears below. You will not need to use this code after youve completed the sign-up process. If you do not sign up before the expiration date, you must request a new code. · "[employer]" Access Code: [email or web address] Expires: 1/14/2018  1:30 PM 
 
4. Enter the last four digits of your Social Security Number (xxxx) and Date of Birth (mm/dd/yyyy) as indicated and click Submit. You will be taken to the next sign-up page. 5. Create a "[employer]" ID. This will be your "[employer]" login ID and cannot be changed, so think of one that is secure and easy to remember. 6. Create a "[employer]" password. You can change your password at any time. 7. Enter your Password Reset Question and Answer. This can be used at a later time if you forget your password. 8. Enter your e-mail address. You will receive e-mail notification when new information is available in [address] 9. Click Sign Up. You can now view and download portions of your medical record. 10. Click the Download Summary menu link to download a portable copy of your medical information. If you have questions, please visit the Frequently Asked Questions section of the "[employer]" website. Remember, "[employer]" is NOT to be used for urgent needs. For medical emergencies, dial 911. Now available from your iPhone and Android! Please provide this summary of care documentation to your next provider. Your primary care clinician is listed as NONE. If you have any questions after today's visit, please call [phone].

## 2017-10-19 LAB — BACTERIA SPEC AEROBE CULT: NORMAL

## 2018-02-06 DIAGNOSIS — E78.00 HYPERCHOLESTEROLEMIA: ICD-10-CM

## 2018-02-07 RX ORDER — ATORVASTATIN CALCIUM 20 MG/1
TABLET, FILM COATED ORAL
Qty: 90 TAB | Refills: 1 | Status: SHIPPED | OUTPATIENT
Start: 2018-02-07 | End: 2019-08-28 | Stop reason: SDUPTHER

## 2018-02-14 DIAGNOSIS — F41.9 ANXIETY: ICD-10-CM

## 2018-02-15 RX ORDER — LORAZEPAM 1 MG/1
TABLET ORAL
Qty: 60 TAB | Refills: 0 | OUTPATIENT
Start: 2018-02-15

## 2018-03-27 ENCOUNTER — OFFICE VISIT (OUTPATIENT)
Dept: FAMILY MEDICINE CLINIC | Age: 58
End: 2018-03-27

## 2018-03-27 VITALS
OXYGEN SATURATION: 98 % | RESPIRATION RATE: 16 BRPM | WEIGHT: 158 LBS | SYSTOLIC BLOOD PRESSURE: 130 MMHG | BODY MASS INDEX: 26.33 KG/M2 | HEART RATE: 77 BPM | DIASTOLIC BLOOD PRESSURE: 80 MMHG | HEIGHT: 65 IN

## 2018-03-27 DIAGNOSIS — F41.9 ANXIETY: ICD-10-CM

## 2018-03-27 DIAGNOSIS — Z00.00 ROUTINE GENERAL MEDICAL EXAMINATION AT A HEALTH CARE FACILITY: Primary | ICD-10-CM

## 2018-03-27 RX ORDER — LORAZEPAM 1 MG/1
TABLET ORAL
Qty: 60 TAB | Refills: 0 | Status: SHIPPED | OUTPATIENT
Start: 2018-03-27 | End: 2020-02-03

## 2018-03-27 NOTE — MR AVS SNAPSHOT
[address] [place] [address] Via [address] 
[phone] Patient: Stevan Castleman MRN: [id number] AXP:[birth year] Visit Information Date & Time Provider Department Dept. Phone Encounter #  
 3/27/2018  1:00 PM Abdirashid Middleton [address] [phone] [id number] Upcoming Health Maintenance Date Due FOBT Q 1 YEAR AGE 50-75 8/9/2017 ZOSTER VACCINE AGE 60> 11/19/2017 DTaP/Tdap/Td series (2 - Td) 3/3/2019 BREAST CANCER SCRN MAMMOGRAM 9/12/2019 PAP AKA CERVICAL CYTOLOGY 7/10/2020 Allergies as of 3/27/2018  Review Complete On: 3/27/2018 By: Abdirashid Middleton MD  
 No Known Allergies Current Immunizations  Never Reviewed Name Date Influenza Vaccine 1/8/2001 Tdap 3/3/2009 Not reviewed this visit You Were Diagnosed With   
  
 Codes Comments Routine general medical examination at a health care facility    -  Primary ICD-10-CM: Z00.00 ICD-9-CM: V70.0 Anxiety     ICD-10-CM: F41.9 ICD-9-CM: 300.00 Vitals BP Pulse Resp Height(growth percentile) Weight(growth percentile) SpO2  
 130/80 (BP 1 Location: Left arm, BP Patient Position: Sitting) 77 16 5' 5.1\" (1.654 m) 158 lb (71.7 kg) 98% BMI OB Status Smoking Status 26.21 kg/m2 Postmenopausal Never Smoker BMI and BSA Data Body Mass Index Body Surface Area  
 26.21 kg/m 2 1.81 m 2 Preferred Pharmacy Pharmacy Name Phone [id number] [address] [phone] Your Updated Medication List  
  
   
This list is accurate as of 3/27/18  1:20 PM.  Always use your most recent med list.  
  
  
  
  
 atorvastatin 20 mg tablet Commonly known as:  LIPITOR  
TAKE 1 TAB BY MOUTH DAILY. FISH OIL 1,000 mg Cap Generic drug:  omega-3 fatty acids-vitamin e Take 1 capsule by mouth. 1 qod  
  
 garlic Cap Take  by mouth. LORazepam 1 mg tablet Commonly known as:  ATIVAN One daily if needed  Indications: anxiety  
  
 losartan-hydroCHLOROthiazide 50-12.5 mg per tablet Commonly known as:  HYZAAR  
TAKE 1 TABLET BY MOUTH EVERY DAY Prescriptions Printed Refills LORazepam (ATIVAN) 1 mg tablet 0 Sig: One daily if needed  Indications: anxiety Class: Print We Performed the Following CBC WITH AUTOMATED DIFF [97494 CPT(R)] FERRITIN [39041 CPT(R)] LIPID PANEL [58383 CPT(R)] METABOLIC PANEL, COMPREHENSIVE [53488 CPT(R)] TSH 3RD GENERATION [58044 CPT(R)] VITAMIN B12 [id number] CPT(R)] VITAMIN D, 25 HYDROXY [id number] CPT(R)] Introducing [facility]! [facility] introduces [employer] patient portal. Now you can access parts of your medical record, email your doctor's office, and request medication refills online. 1. In your internet browser, go to [email or web address] 2. Click on the First Time User? Click Here link in the Sign In box. You will see the New Member Sign Up page. 3. Enter your [employer] Access Code exactly as it appears below. You will not need to use this code after youve completed the sign-up process. If you do not sign up before the expiration date, you must request a new code. · [employer] Access Code: [email or web address] Expires: 6/25/2018  1:20 PM 
 
4. Enter the last four digits of your Social Security Number (xxxx) and Date of Birth (mm/dd/yyyy) as indicated and click Submit. You will be taken to the next sign-up page. 5. Create a [employer] ID. This will be your [employer] login ID and cannot be changed, so think of one that is secure and easy to remember. 6. Create a [employer] password. You can change your password at any time. 7. Enter your Password Reset Question and Answer. This can be used at a later time if you forget your password. 8. Enter your e-mail address.  You will receive e-mail notification when new information is available in [employer]. 9. Click Sign Up. You can now view and download portions of your medical record. 10. Click the Download Summary menu link to download a portable copy of your medical information. If you have questions, please visit the Frequently Asked Questions section of the [employer] website. Remember, [employer] is NOT to be used for urgent needs. For medical emergencies, dial 911. Now available from your iPhone and Android! Please provide this summary of care documentation to your next provider. Your primary care clinician is listed as Charli Shepard. If you have any questions after today's visit, please call [phone].

## 2018-03-27 NOTE — PROGRESS NOTES
Chief Complaint   Patient presents with    Annual Exam         HPI:       is a 59 y.o. female. 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 Mom had a silent stroke. Has ST and PT as an outpatient. Son is autistic:  Tejas Bautista is 25. Lives at her home. Speaks and is able to perform his own hygiene and use a telephone. Speech is difficult for him. He excels in math. Describes her life is stressful and she will occasionally take a half a dose of Lorazepam.  Does not take this often. No Known Allergies    Current Outpatient Prescriptions   Medication Sig    garlic cap Take  by mouth.  losartan-hydroCHLOROthiazide (HYZAAR) 50-12.5 mg per tablet TAKE 1 TABLET BY MOUTH EVERY DAY    omega-3 fatty acids-vitamin e (FISH OIL) 1,000 mg cap Take 1 capsule by mouth. 1 qod    atorvastatin (LIPITOR) 20 mg tablet TAKE 1 TAB BY MOUTH DAILY.  LORazepam (ATIVAN) 1 mg tablet One daily if needed     No current facility-administered medications for this visit. Past Medical History:   Diagnosis Date    Allergic rhinitis     Anemia     blood loss    Hypercholesterolemia     Hypertension     Irregular menses          ROS:  Denies fever, chills, cough, chest pain, SOB,  nausea, vomiting, or diarrhea. Denies wt loss, wt gain, hemoptysis, hematochezia or melena. Physical Examination:    /80 (BP 1 Location: Left arm, BP Patient Position: Sitting)  Pulse 77  Resp 16  Ht 5' 5.1\" (1.654 m)  Wt 158 lb (71.7 kg)  SpO2 98%  BMI 26.21 kg/m2    General: Alert and Ox3, Fluent speech  HEENT:  NC/AT, EOMI, OP: clear  Neck:  Supple, no adenopathy, JVD, mass or bruit  Chest:  Clear to Ausculation, without wheezes, rales, rubs or ronchi  Cardiac: RRR  Abdomen:  +BS, soft, nontender without palpable HSM  Extremities:  No cyanosis, clubbing or edema  Neurologic:  Ambulatory without assist, CN 2-12 grossly intact. Moves all extremities.   Skin: no rash  Lymphadenopathy: no cervical or supraclavicular nodes      ASSESSMENT AND PLAN:     1. Normal exam:  She is due for labs. Will refill Lorazepam for occasional use on a prn basis. 2.  HTN is well controlled  3. Labs today to review lipids. Orders Placed This Encounter    LIPID PANEL    METABOLIC PANEL, COMPREHENSIVE    CBC WITH AUTOMATED DIFF    FERRITIN    VITAMIN B12    VITAMIN D, 25 HYDROXY    TSH 3RD GENERATION    garlic cap     Sig: Take  by mouth.        Dieter Cintron MD, [place]

## 2018-03-27 NOTE — ACP (ADVANCE CARE PLANNING)
Patient state she doesn't have an Advance Directive, given copy of [id number] [facility] form

## 2018-03-30 LAB
25(OH)D3+25(OH)D2 SERPL-MCNC: 47.7 NG/ML (ref 30–100)
ALBUMIN SERPL-MCNC: 4.5 G/DL (ref 3.6–4.8)
ALBUMIN/GLOB SERPL: 1.6 {RATIO} (ref 1.2–2.2)
ALP SERPL-CCNC: 64 IU/L (ref 39–117)
ALT SERPL-CCNC: 14 IU/L (ref 0–32)
AST SERPL-CCNC: 15 IU/L (ref 0–40)
BASOPHILS # BLD AUTO: 0 X10E3/UL (ref 0–0.2)
BASOPHILS NFR BLD AUTO: 0 %
BILIRUB SERPL-MCNC: 0.6 MG/DL (ref 0–1.2)
BUN SERPL-MCNC: 9 MG/DL (ref 8–27)
BUN/CREAT SERPL: 16 (ref 12–28)
CALCIUM SERPL-MCNC: 9.7 MG/DL (ref 8.7–10.3)
CHLORIDE SERPL-SCNC: 102 MMOL/L (ref 96–106)
CHOLEST SERPL-MCNC: 161 MG/DL (ref 100–199)
CO2 SERPL-SCNC: 24 MMOL/L (ref 18–29)
CREAT SERPL-MCNC: 0.57 MG/DL (ref 0.57–1)
EOSINOPHIL # BLD AUTO: 0.1 X10E3/UL (ref 0–0.4)
EOSINOPHIL NFR BLD AUTO: 2 %
ERYTHROCYTE [DISTWIDTH] IN BLOOD BY AUTOMATED COUNT: 14.3 % (ref 12.3–15.4)
FERRITIN SERPL-MCNC: 326 NG/ML (ref 15–150)
GFR SERPLBLD CREATININE-BSD FMLA CKD-EPI: 101 ML/MIN/1.73
GFR SERPLBLD CREATININE-BSD FMLA CKD-EPI: 117 ML/MIN/1.73
GLOBULIN SER CALC-MCNC: 2.8 G/DL (ref 1.5–4.5)
GLUCOSE SERPL-MCNC: 76 MG/DL (ref 65–99)
HCT VFR BLD AUTO: 38.8 % (ref 34–46.6)
HDLC SERPL-MCNC: 59 MG/DL
HGB BLD-MCNC: 12.6 G/DL (ref 11.1–15.9)
IMM GRANULOCYTES # BLD: 0 X10E3/UL (ref 0–0.1)
IMM GRANULOCYTES NFR BLD: 0 %
LDLC SERPL CALC-MCNC: 85 MG/DL (ref 0–99)
LYMPHOCYTES # BLD AUTO: 2 X10E3/UL (ref 0.7–3.1)
LYMPHOCYTES NFR BLD AUTO: 39 %
MCH RBC QN AUTO: 25.7 PG (ref 26.6–33)
MCHC RBC AUTO-ENTMCNC: 32.5 G/DL (ref 31.5–35.7)
MCV RBC AUTO: 79 FL (ref 79–97)
MONOCYTES # BLD AUTO: 0.3 X10E3/UL (ref 0.1–0.9)
MONOCYTES NFR BLD AUTO: 7 %
NEUTROPHILS # BLD AUTO: 2.8 X10E3/UL (ref 1.4–7)
NEUTROPHILS NFR BLD AUTO: 52 %
PLATELET # BLD AUTO: 256 X10E3/UL (ref 150–379)
POTASSIUM SERPL-SCNC: 4.6 MMOL/L (ref 3.5–5.2)
PROT SERPL-MCNC: 7.3 G/DL (ref 6–8.5)
RBC # BLD AUTO: 4.9 X10E6/UL (ref 3.77–5.28)
SODIUM SERPL-SCNC: 143 MMOL/L (ref 134–144)
TRIGL SERPL-MCNC: 83 MG/DL (ref 0–149)
TSH SERPL DL<=0.005 MIU/L-ACNC: 1.13 UIU/ML (ref 0.45–4.5)
VIT B12 SERPL-MCNC: 1238 PG/ML (ref 232–1245)
VLDLC SERPL CALC-MCNC: 17 MG/DL (ref 5–40)
WBC # BLD AUTO: 5.3 X10E3/UL (ref 3.4–10.8)

## 2018-08-30 DIAGNOSIS — I10 ESSENTIAL HYPERTENSION WITH GOAL BLOOD PRESSURE LESS THAN 140/90: ICD-10-CM

## 2018-08-30 RX ORDER — LOSARTAN POTASSIUM AND HYDROCHLOROTHIAZIDE 12.5; 5 MG/1; MG/1
TABLET ORAL
Qty: 90 TAB | Refills: 1 | Status: SHIPPED | OUTPATIENT
Start: 2018-08-30 | End: 2019-05-06 | Stop reason: ALTCHOICE

## 2021-03-30 DIAGNOSIS — E78.00 HYPERCHOLESTEROLEMIA: ICD-10-CM

## 2021-03-30 RX ORDER — ATORVASTATIN CALCIUM 20 MG/1
TABLET, FILM COATED ORAL
Qty: 90 TAB | Refills: 4 | Status: SHIPPED | OUTPATIENT
Start: 2021-03-30 | End: 2022-10-24

## 2021-05-05 ENCOUNTER — TELEPHONE (OUTPATIENT)
Dept: FAMILY MEDICINE CLINIC | Age: 61
End: 2021-05-05

## 2021-05-05 NOTE — TELEPHONE ENCOUNTER
----- Message from Iqra Cervantes sent at 5/5/2021  9:06 AM EDT -----  Regarding: IZABELLA Guerra/telephone  Contact: [phone]  General Message/Vendor Calls    Caller's first and last name:Lurdes Singer      Reason for call:Pt wants to discuss having cyst removed from shoulder.       Callback required yes/no and why:yes      Best contact number(s):[phone]      Details to clarify the request:      Iqra Cervantes

## 2021-08-03 PROBLEM — I10 HYPERTENSION: Status: RESOLVED | Noted: 2021-08-03 | Resolved: 2021-08-03

## 2021-08-04 ENCOUNTER — OFFICE VISIT (OUTPATIENT)
Dept: FAMILY MEDICINE CLINIC | Age: 61
End: 2021-08-04
Payer: COMMERCIAL

## 2021-08-04 VITALS
SYSTOLIC BLOOD PRESSURE: 122 MMHG | WEIGHT: 166 LBS | BODY MASS INDEX: 28.34 KG/M2 | OXYGEN SATURATION: 98 % | HEIGHT: 64 IN | DIASTOLIC BLOOD PRESSURE: 72 MMHG | TEMPERATURE: 97.3 F | RESPIRATION RATE: 18 BRPM | HEART RATE: 71 BPM

## 2021-08-04 DIAGNOSIS — I10 ESSENTIAL HYPERTENSION: Primary | ICD-10-CM

## 2021-08-04 DIAGNOSIS — Z00.00 WELLNESS EXAMINATION: ICD-10-CM

## 2021-08-04 DIAGNOSIS — L98.9 SKIN LESION: ICD-10-CM

## 2021-08-04 DIAGNOSIS — Z12.31 ENCOUNTER FOR SCREENING MAMMOGRAM FOR MALIGNANT NEOPLASM OF BREAST: ICD-10-CM

## 2021-08-04 DIAGNOSIS — Z12.11 COLON CANCER SCREENING: ICD-10-CM

## 2021-08-04 DIAGNOSIS — E78.00 HYPERCHOLESTEROLEMIA: ICD-10-CM

## 2021-08-04 PROCEDURE — 99396 PREV VISIT EST AGE 40-64: CPT | Performed by: NURSE PRACTITIONER

## 2021-08-04 NOTE — PROGRESS NOTES
1. Have you been to the ER, urgent care clinic since your last visit? Hospitalized since your last visit? No    2. Have you seen or consulted any other health care providers outside of the [address] [place] [address] since your last visit? Include any pap smears or colon screening.  No      Chief Complaint   Patient presents with    Physical    Lesion     check on rt forearm and left shoulder         Visit Vitals  /72 (BP 1 Location: Right upper arm, BP Patient Position: Sitting, BP Cuff Size: Adult)   Pulse 71   Temp 97.3 °F (36.3 °C) (Temporal)   Resp 18   Ht 5' 4\" (1.626 m)   Wt 166 lb (75.3 kg)   SpO2 98%   BMI 28.49 kg/m²       Pain Scale: 0 - No pain/10  Pain Location:

## 2021-08-06 NOTE — PROGRESS NOTES
Subjective:     Beatriz Gaytan is a 59 y.o. female who presents today with the following:  Chief Complaint   Patient presents with    Physical    Lesion     check on rt forearm and left shoulder       Patient Active Problem List   Diagnosis Code    Anemia D64.9    Allergic rhinitis J30.9    Hypercholesterolemia E78.00    Anxiety F41.9    Essential hypertension with goal blood pressure less than 140/90 I10         COMPLIANT WITH MEDICATION:   HTN; Denies chest pain, dyspnea, palpitations, headache and blurred vision. Blood pressure normotensive. depression screening addressed not at risk    abuse screening addressed denies    learning assessment addressed reviewed nurses notes    fall risk addressed not at risk    HM: addressed    ROS:  Gen: denies fever, chills, fatigue, weight loss, weight gain  HEENT:denies blurry vision, nasal congestion, sore throat  Resp: denies dypsnea, cough, wheezing  CV: denies chest pain radiating to the jaws or arms, palpitations, lower extremity edema  Abd: denies nausea, vomiting, diarrhea, constipation  Neuro: denies numbness/tingling  Endo: denies polyuria, polydipsia, heat/cold intolerance  Heme: no lymphadenopathy    Allergies   Allergen Reactions    Amoxicillin Other (comments)     Pt wont take it because of family members being allergic to it         Current Outpatient Medications:     atorvastatin (LIPITOR) 20 mg tablet, TAKE 1 TABLET BY MOUTH EVERY DAY, Disp: 90 Tab, Rfl: 4    famotidine (PEPCID) 20 mg tablet, TAKE 1 TAB BY MOUTH TWICE A DAY FOR 2 WEEKS AND THEN TWICE A DAY AS NEEDED, Disp: 60 Tab, Rfl: 0    hydroCHLOROthiazide (HYDRODIURIL) 12.5 mg tablet, TAKE 1 TABLET BY MOUTH DAILY.  INDICATIONS: HIGH BLOOD PRESSURE, Disp: 90 Tab, Rfl: 4    amLODIPine (NORVASC) 5 mg tablet, TAKE 1 TABLET BY MOUTH EVERY DAY, Disp: 90 Tab, Rfl: 4    LORazepam (ATIVAN) 1 mg tablet, TAKE 1 TABLET BY MOUTH DAILY IF NEEDED, Disp: 60 Tab, Rfl: 2    garlic cap, Take  by mouth., Disp: , Rfl:     omega-3 fatty acids-vitamin e (FISH OIL) 1,000 mg cap, Take 1 capsule by mouth. 1 qod, Disp: , Rfl:     Past Medical History:   Diagnosis Date    Allergic rhinitis     Anemia     blood loss    Hypercholesterolemia     Hypertension     Irregular menses        Past Surgical History:   Procedure Laterality Date    HX  SECTION      HX TUBAL LIGATION         Social History     Tobacco Use   Smoking Status Never Smoker   Smokeless Tobacco Never Used       Social History     Socioeconomic History    Marital status: SINGLE     Spouse name: Not on file    Number of children: Not on file    Years of education: Not on file    Highest education level: Not on file   Occupational History    Occupation: teacher     Employer: [employer]   Tobacco Use    Smoking status: Never Smoker    Smokeless tobacco: Never Used   Vaping Use    Vaping Use: Never used   Substance and Sexual Activity    Alcohol use: No    Drug use: No    Sexual activity: Not Currently     Social Determinants of Health     Financial Resource Strain:     Difficulty of Paying Living Expenses:    Food Insecurity:     Worried About Running Out of Food in the Last Year:     Ran Out of Food in the Last Year:    Transportation Needs:     Lack of Transportation (Medical):      Lack of Transportation (Non-Medical):    Physical Activity:     Days of Exercise per Week:     Minutes of Exercise per Session:    Stress:     Feeling of Stress :    Social Connections:     Frequency of Communication with Friends and Family:     Frequency of Social Gatherings with Friends and Family:     Attends Religion Services:     Active Member of Clubs or Organizations:     Attends Club or Organization Meetings:     Marital Status:        Family History   Problem Relation Age of Onset    Hypertension Mother     Diabetes Mother     Stroke Father     Hypertension Father     Diabetes Father     Arrhythmia Father         A fib    Hypertension Sister     Hypertension Brother     Diabetes Brother     Diabetes Maternal Grandmother     Cancer Paternal Grandmother         Lung--smoker    Breast Cancer Maternal Aunt          Objective:     Visit Vitals  /72 (BP 1 Location: Right upper arm, BP Patient Position: Sitting, BP Cuff Size: Adult)   Pulse 71   Temp 97.3 °F (36.3 °C) (Temporal)   Resp 18   Ht 5' 4\" (1.626 m)   Wt 166 lb (75.3 kg)   SpO2 98%   BMI 28.49 kg/m²     Body mass index is 28.49 kg/m². General: Alert and oriented. No acute distress. Well nourished  HEENT :  Ears:TMs are normal. Canals are clear. Eyes: pupils equal, round, react to light and accommodation. Extra ocular movements intact. Nose: patent. Mouth and throat is clear. Neck:supple full range of motion no thyromegaly. Trachea midline, No carotid bruits. No significant lymphadenopathy  Lungs[de-identified] clear to auscultation without wheezes, rales, or rhonchi. Heart :RRR, S1 & S2 are normal intensity. No murmur; no gallop  Abdomen: bowel sounds active. No tenderness, guarding, rebound, masses, hepatic or spleen enlargement  Back: no CVA tenderness. Extremities: without clubbing, cyanosis, or edema  Pulses: radial and femoral pulses are normal  Neuro: HMF intact. Cranial nerves II through XII grossly normal.  Motor: is 5 over 5 and symmetrical.   Deep tendon reflexes: +2 equal  Integumentary; lesions noted  on left forearm and right shoulder . Psych:appropriate behavior, mood, affect and judgement.    Results for orders placed or performed in visit on 10/20/20   CBC WITH AUTOMATED DIFF   Result Value Ref Range    WBC 4.0 3.4 - 10.8 x10E3/uL    RBC 5.17 3.77 - 5.28 x10E6/uL    HGB 13.1 11.1 - 15.9 g/dL    HCT 40.7 34.0 - 46.6 %    MCV 79 79 - 97 fL    MCH 25.3 (L) 26.6 - 33.0 pg    MCHC 32.2 31.5 - 35.7 g/dL    RDW 13.0 11.7 - 15.4 %    PLATELET 477 748 - 558 x10E3/uL    NEUTROPHILS 53 Not Estab. %    Lymphocytes 33 Not Estab. %    MONOCYTES 11 Not Estab. % EOSINOPHILS 2 Not Estab. %    BASOPHILS 1 Not Estab. %    ABS. NEUTROPHILS 2.2 1.4 - 7.0 x10E3/uL    Abs Lymphocytes 1.3 0.7 - 3.1 x10E3/uL    ABS. MONOCYTES 0.4 0.1 - 0.9 x10E3/uL    ABS. EOSINOPHILS 0.1 0.0 - 0.4 x10E3/uL    ABS. BASOPHILS 0.0 0.0 - 0.2 x10E3/uL    IMMATURE GRANULOCYTES 0 Not Estab. %    ABS. IMM. GRANS. 0.0 0.0 - 0.1 H97X1/JN   METABOLIC PANEL, COMPREHENSIVE   Result Value Ref Range    Glucose 81 65 - 99 mg/dL    BUN 12 8 - 27 mg/dL    Creatinine 0.70 0.57 - 1.00 mg/dL    GFR est non-AA 93 >59 mL/min/1.73    GFR est  >59 mL/min/1.73    BUN/Creatinine ratio 17 12 - 28    Sodium 139 134 - 144 mmol/L    Potassium 3.8 3.5 - 5.2 mmol/L    Chloride 100 96 - 106 mmol/L    CO2 27 20 - 29 mmol/L    Calcium 9.7 8.7 - 10.3 mg/dL    Protein, total 7.3 6.0 - 8.5 g/dL    Albumin 4.5 3.8 - 4.8 g/dL    GLOBULIN, TOTAL 2.8 1.5 - 4.5 g/dL    A-G Ratio 1.6 1.2 - 2.2    Bilirubin, total 0.8 0.0 - 1.2 mg/dL    Alk. phosphatase 80 39 - 117 IU/L    AST (SGOT) 13 0 - 40 IU/L    ALT (SGPT) 10 0 - 32 IU/L   LIPID PANEL   Result Value Ref Range    Cholesterol, total 206 (H) 100 - 199 mg/dL    Triglyceride 75 0 - 149 mg/dL    HDL Cholesterol 61 >39 mg/dL    VLDL, calculated 13 5 - 40 mg/dL    LDL, calculated 132 (H) 0 - 99 mg/dL   TSH RFX ON ABNORMAL TO FREE T4   Result Value Ref Range    TSH 0.954 0.450 - 4.500 uIU/mL   PAP IG, APTIMA HPV AND RFX 92/33,70 ([id number])   Result Value Ref Range    Diagnosis Comment     Specimen adequacy Comment     Clinician provided ICD10 Comment     Performed by: Reyna     . Kermit Dominguez Note: Comment     Test methodology Comment     HPV APTIMA Negative Negative       No results found for this visit on 08/04/21. Assessment/ Plan:     1. Encounter for screening mammogram for malignant neoplasm of breast    - [facility] MAMMO BI SCREENING INCL CAD; Future    2. Wellness examination  Continue medications as noted above    3.  Essential hypertension  BP in goal no change to antihypertensive and cholesterol medication    4. Hypercholesterolemia    BP in goal no change to antihypertensive and cholesterol medication      5. Skin lesion    - REFERRAL TO DERMATOLOGY    Orders Placed This Encounter    [facility] MAMMO BI SCREENING INCL CAD     Standing Status:   Future     Standing Expiration Date:   9/4/2021     Scheduling Instructions:      [facility]         Verbal and written instructions (see AVS) provided. Patient expresses understanding of diagnosis and treatment plan.     Health Maintenance Due   Topic Date Due    Shingrix Vaccine Age 49> (1 of 2) Never done    Breast Cancer Screen Mammogram  09/12/2019    Colorectal Cancer Screening Combo  08/28/2020               KRISTINA Cline-C

## 2021-08-06 NOTE — ACP (ADVANCE CARE PLANNING)
Discussed importance of advanced medical directives with patient. Patient is capable of making decisions.  Mirna Hernandez NP-C

## 2021-08-13 ENCOUNTER — HOSPITAL ENCOUNTER (OUTPATIENT)
Dept: MAMMOGRAPHY | Age: 61
Discharge: HOME | End: 2021-08-13
Attending: NURSE PRACTITIONER
Payer: COMMERCIAL

## 2021-08-13 DIAGNOSIS — Z12.31 ENCOUNTER FOR SCREENING MAMMOGRAM FOR MALIGNANT NEOPLASM OF BREAST: ICD-10-CM

## 2021-08-13 LAB — HEMOCCULT STL QL IA: NEGATIVE

## 2021-08-13 PROCEDURE — 77067 SCR MAMMO BI INCL CAD: CPT

## 2021-12-07 ENCOUNTER — TELEPHONE (OUTPATIENT)
Dept: FAMILY MEDICINE CLINIC | Age: 61
End: 2021-12-07

## 2021-12-07 NOTE — TELEPHONE ENCOUNTER
LM returning pt call, she may CB to speak with nurse or we could offer her a VV with Tad Hanna before 2:00 pm today or schedule for another day.

## 2021-12-07 NOTE — TELEPHONE ENCOUNTER
----- Message from Violet Mena sent at 12/6/2021  7:33 AM EST -----  Subject: Message to Provider    QUESTIONS  Information for Provider? Pt. Kenia Jacobson would like to have Dr. Jaclyn Cornelius give her a call 3:30 or after to go over her Sinuses that occur   each year to make sure she is doing what she needs to do.  ---------------------------------------------------------------------------  --------------  CALL BACK INFO  What is the best way for the office to contact you? OK to leave message on   voicemail  Preferred Call Back Phone Number? [phone]  ---------------------------------------------------------------------------  --------------  SCRIPT ANSWERS  Relationship to Patient?  Self

## 2022-03-18 PROBLEM — F41.9 ANXIETY: Status: ACTIVE | Noted: 2017-07-10

## 2022-03-19 PROBLEM — I10 ESSENTIAL HYPERTENSION WITH GOAL BLOOD PRESSURE LESS THAN 140/90: Status: ACTIVE | Noted: 2017-07-10

## 2022-10-17 RX ORDER — HYDROCHLOROTHIAZIDE 12.5 MG/1
TABLET ORAL
Qty: 90 TABLET | Refills: 0 | Status: SHIPPED | OUTPATIENT
Start: 2022-10-17

## 2022-10-17 RX ORDER — AMLODIPINE BESYLATE 5 MG/1
5 TABLET ORAL DAILY
Qty: 90 TABLET | Refills: 0 | Status: SHIPPED | OUTPATIENT
Start: 2022-10-17

## 2022-10-17 NOTE — TELEPHONE ENCOUNTER
Message routed to provider to approve refill. Requested Prescriptions     Pending Prescriptions Disp Refills    hydroCHLOROthiazide (HYDRODIURIL) 12.5 mg tablet 90 Tablet 4    amLODIPine (NORVASC) 5 mg tablet 90 Tablet 4     Sig: Take 1 Tablet by mouth daily.

## 2022-11-08 ENCOUNTER — OFFICE VISIT (OUTPATIENT)
Dept: FAMILY MEDICINE CLINIC | Age: 62
End: 2022-11-08
Payer: COMMERCIAL

## 2022-11-08 VITALS
RESPIRATION RATE: 18 BRPM | SYSTOLIC BLOOD PRESSURE: 138 MMHG | HEIGHT: 64 IN | TEMPERATURE: 97.6 F | BODY MASS INDEX: 28.44 KG/M2 | HEART RATE: 78 BPM | WEIGHT: 166.6 LBS | OXYGEN SATURATION: 98 % | DIASTOLIC BLOOD PRESSURE: 76 MMHG

## 2022-11-08 DIAGNOSIS — I10 ESSENTIAL HYPERTENSION WITH GOAL BLOOD PRESSURE LESS THAN 140/90: ICD-10-CM

## 2022-11-08 DIAGNOSIS — Z12.31 SCREENING MAMMOGRAM FOR BREAST CANCER: ICD-10-CM

## 2022-11-08 DIAGNOSIS — Z00.00 WELLNESS EXAMINATION: Primary | ICD-10-CM

## 2022-11-08 DIAGNOSIS — E78.00 HYPERCHOLESTEROLEMIA: ICD-10-CM

## 2022-11-08 PROCEDURE — 3078F DIAST BP <80 MM HG: CPT | Performed by: NURSE PRACTITIONER

## 2022-11-08 PROCEDURE — 3074F SYST BP LT 130 MM HG: CPT | Performed by: NURSE PRACTITIONER

## 2022-11-08 PROCEDURE — 99396 PREV VISIT EST AGE 40-64: CPT | Performed by: NURSE PRACTITIONER

## 2022-11-08 NOTE — PROGRESS NOTES
Chief Complaint   Patient presents with    Medication Refill         HPI:     is a 57 y.o. female who presents today for her annual exam. 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 She is hoping to retire in the near future. HTN: Complaint with HCTZ, amlodipine. Does not routinely monitor BP at home. Denies HA, CP. Hyperlipidemia: On atorvastatin. Due for lipid panel today. Anxiety: Uses lorazepam very rarely. Filled #60 in February 2020, no need for a refill today. New issues: Feeling well. She had a URI in September, seen at urgent care. Allergies   Allergen Reactions    Amoxicillin Other (comments)     Pt wont take it because of family members being allergic to it       Current Outpatient Medications   Medication Sig    atorvastatin (LIPITOR) 20 mg tablet TAKE 1 TABLET BY MOUTH EVERY DAY    hydroCHLOROthiazide (HYDRODIURIL) 12.5 mg tablet TAKE 1 TABLET BY MOUTH EVERY DAY FOR BLOOD PRESSURE    amLODIPine (NORVASC) 5 mg tablet Take 1 Tablet by mouth daily. garlic cap Take  by mouth. omega-3 fatty acids-vitamin e 1,000 mg cap Take 1 capsule by mouth. 1 qod     No current facility-administered medications for this visit. Past Medical History:   Diagnosis Date    Allergic rhinitis     Anemia     blood loss    Hypercholesterolemia     Hypertension     Irregular menses     Menopause        Family History   Problem Relation Age of Onset    Hypertension Mother     Diabetes Mother     Stroke Father     Hypertension Father     Diabetes Father     Arrhythmia Father         A fib    Hypertension Sister     Hypertension Brother     Diabetes Brother     Diabetes Maternal Grandmother     Cancer Paternal Grandmother         Lung--smoker    Breast Cancer Maternal Aunt        ROS:  Denies fever, chills, cough, chest pain, SOB,  nausea, vomiting, diarrhea, dysuria. Denies rashes, wounds, arthralgias, weakness, numbness, visual changes, depression.   Denies wt loss, wt gain, hemoptysis, hematochezia or melena. Patient is not experiencing chest pain radiating to the jaw and/or down the arms. Physical Examination:    /76 (BP 1 Location: Right arm, BP Patient Position: Sitting, BP Cuff Size: Adult)   Pulse 78   Temp 97.6 °F (36.4 °C) (Temporal)   Resp 18   Ht 5' 4\" (1.626 m)   Wt 166 lb 9.6 oz (75.6 kg)   SpO2 98%   BMI 28.60 kg/m²     Wt Readings from Last 3 Encounters:   11/08/22 166 lb 9.6 oz (75.6 kg)   08/04/21 166 lb (75.3 kg)   10/20/20 160 lb 9.6 oz (72.8 kg)     Constitutional: WDWN Female in no acute distress  HENT:  NC/AT, TMs pearly gray, OP: clear  EYES: EOMI, PERRL  Neck:  Supple, no JVD, mass or bruit. No thyromegaly. Respiratory:  Respirations even and unlabored without use of accessory muscles, CTA throughout without wheezes, rales, rubs or rhonchi. Symmetrical chest expansion. Cardiac: RRR no clicks, murmurs, gallops, or rubs  Abdomen:  +BS, soft, nontender without palpable HSM   Musculoskeletal:  No cyanosis, clubbing or edema of extremities. Moves all extremities without difficulty. Neurologic:  Smooth, even gait without assistance, CN 2-12 grossly intact. Skin: intact and warm to the touch, no rash   Lymphadenopathy: no cervical or supraclavicular nodes  Psych: Pleasant and appropriate. Judgment normal. Alert and oriented x 3. ASSESSMENT AND PLAN:       ICD-10-CM ICD-9-CM    1. Wellness examination  Z00.00 V70.0 COLLECTION VENOUS BLOOD,VENIPUNCTURE      CBC WITH AUTOMATED DIFF      LIPID PANEL      METABOLIC PANEL, COMPREHENSIVE      TSH 3RD GENERATION      VITAMIN D, 25 HYDROXY      VITAMIN D, 25 HYDROXY      TSH 3RD GENERATION      METABOLIC PANEL, COMPREHENSIVE      LIPID PANEL      CBC WITH AUTOMATED DIFF      COLLECTION VENOUS BLOOD,VENIPUNCTURE      2. Screening mammogram for breast cancer  Z12.31 V76.12 [facility] 3D [facility] MAMMO BI SCREENING INCL CAD      3.  Essential hypertension with goal blood pressure less than 140/90 I10 401.9 COLLECTION VENOUS BLOOD,VENIPUNCTURE      CBC WITH AUTOMATED DIFF      LIPID PANEL      METABOLIC PANEL, COMPREHENSIVE      TSH 3RD GENERATION      TSH 3RD GENERATION      METABOLIC PANEL, COMPREHENSIVE      LIPID PANEL      CBC WITH AUTOMATED DIFF      COLLECTION VENOUS BLOOD,VENIPUNCTURE      4. Hypercholesterolemia  E78.00 272.0 COLLECTION VENOUS BLOOD,VENIPUNCTURE      CBC WITH AUTOMATED DIFF      LIPID PANEL      METABOLIC PANEL, COMPREHENSIVE      TSH 3RD GENERATION      TSH 3RD GENERATION      METABOLIC PANEL, COMPREHENSIVE      LIPID PANEL      CBC WITH AUTOMATED DIFF      COLLECTION VENOUS BLOOD,VENIPUNCTURE        Patient declines outstanding vaccinations. Check up labs today. Mammogram ordered, pt to self-schedule. Cologuard form completed in the office. Patient aware of plan of care and verbalized understanding. Questions answered. RTC annually, or sooner if needed.     Ryan Hadley, IZABELLA

## 2022-11-08 NOTE — PROGRESS NOTES
1. \"Have you been to the ER, urgent care clinic since your last visit? Hospitalized since your last visit? \" No    2. \"Have you seen or consulted any other health care providers outside of the [address] [place] [address] since your last visit? \" No     3. For patients aged 39-70: Has the patient had a colonoscopy / FIT/ Cologuard? Yes - Care Gap present. Most recent result on file      If the patient is female:    4. For patients aged 41-77: Has the patient had a mammogram within the past 2 years? Yes - Care Gap present. Most recent result on file      5. For patients aged 21-65: Has the patient had a pap smear?  NA - based on age or sex

## 2022-11-10 DIAGNOSIS — E78.00 HYPERCHOLESTEROLEMIA: ICD-10-CM

## 2022-11-10 LAB
25(OH)D3 SERPL-MCNC: 23.4 NG/ML (ref 30–100)
ALBUMIN SERPL-MCNC: 3.9 G/DL (ref 3.5–5)
ALBUMIN/GLOB SERPL: 1 {RATIO} (ref 1.1–2.2)
ALP SERPL-CCNC: 80 U/L (ref 45–117)
ALT SERPL-CCNC: 19 U/L (ref 12–78)
ANION GAP SERPL CALC-SCNC: 9 MMOL/L (ref 5–15)
AST SERPL-CCNC: 19 U/L (ref 15–37)
BASOPHILS # BLD: 0.1 K/UL (ref 0–0.1)
BASOPHILS NFR BLD: 1 % (ref 0–1)
BILIRUB SERPL-MCNC: 0.9 MG/DL (ref 0.2–1)
BUN SERPL-MCNC: 13 MG/DL (ref 6–20)
BUN/CREAT SERPL: 19 (ref 12–20)
CALCIUM SERPL-MCNC: 9.3 MG/DL (ref 8.5–10.1)
CHLORIDE SERPL-SCNC: 105 MMOL/L (ref 97–108)
CHOLEST SERPL-MCNC: 198 MG/DL
CO2 SERPL-SCNC: 27 MMOL/L (ref 21–32)
CREAT SERPL-MCNC: 0.67 MG/DL (ref 0.55–1.02)
DIFFERENTIAL METHOD BLD: ABNORMAL
EOSINOPHIL # BLD: 0.1 K/UL (ref 0–0.4)
EOSINOPHIL NFR BLD: 2 % (ref 0–7)
ERYTHROCYTE [DISTWIDTH] IN BLOOD BY AUTOMATED COUNT: 14.6 % (ref 11.5–14.5)
GLOBULIN SER CALC-MCNC: 3.9 G/DL (ref 2–4)
GLUCOSE SERPL-MCNC: 76 MG/DL (ref 65–100)
HCT VFR BLD AUTO: 42.1 % (ref 35–47)
HDLC SERPL-MCNC: 61 MG/DL
HDLC SERPL: 3.2 {RATIO} (ref 0–5)
HGB BLD-MCNC: 12.7 G/DL (ref 11.5–16)
IMM GRANULOCYTES # BLD AUTO: 0 K/UL (ref 0–0.04)
IMM GRANULOCYTES NFR BLD AUTO: 1 % (ref 0–0.5)
LDLC SERPL CALC-MCNC: 121.2 MG/DL (ref 0–100)
LYMPHOCYTES # BLD: 1.1 K/UL (ref 0.8–3.5)
LYMPHOCYTES NFR BLD: 27 % (ref 12–49)
MCH RBC QN AUTO: 25 PG (ref 26–34)
MCHC RBC AUTO-ENTMCNC: 30.2 G/DL (ref 30–36.5)
MCV RBC AUTO: 83 FL (ref 80–99)
MONOCYTES # BLD: 0.4 K/UL (ref 0–1)
MONOCYTES NFR BLD: 9 % (ref 5–13)
NEUTS SEG # BLD: 2.4 K/UL (ref 1.8–8)
NEUTS SEG NFR BLD: 60 % (ref 32–75)
NRBC # BLD: 0 K/UL (ref 0–0.01)
NRBC BLD-RTO: 0 PER 100 WBC
PLATELET # BLD AUTO: 240 K/UL (ref 150–400)
PMV BLD AUTO: 11.8 FL (ref 8.9–12.9)
POTASSIUM SERPL-SCNC: 4.3 MMOL/L (ref 3.5–5.1)
PROT SERPL-MCNC: 7.8 G/DL (ref 6.4–8.2)
RBC # BLD AUTO: 5.07 M/UL (ref 3.8–5.2)
SODIUM SERPL-SCNC: 141 MMOL/L (ref 136–145)
TRIGL SERPL-MCNC: 79 MG/DL (ref ?–150)
TSH SERPL DL<=0.05 MIU/L-ACNC: 1.03 UIU/ML (ref 0.36–3.74)
VLDLC SERPL CALC-MCNC: 15.8 MG/DL
WBC # BLD AUTO: 4 K/UL (ref 3.6–11)

## 2022-11-10 RX ORDER — ATORVASTATIN CALCIUM 40 MG/1
40 TABLET, FILM COATED ORAL DAILY
Qty: 90 TABLET | Refills: 2 | Status: SHIPPED | OUTPATIENT
Start: 2022-11-10 | End: 2022-11-15

## 2022-11-10 NOTE — PROGRESS NOTES
Please call. Vitamin D is slightly low at 23. Normal is 30 or higher. I recommend she take vitamin D 2000 units daily. This will be good for bone health as well. Cholesterol is not as low as I would expect it on her atorvastatin. I am going to adjust her dose slightly. She can take 2 of her 20 mg tablets until she runs out and then start taking 40 mg tablets. I sent the new Rx to the pharmacy for her. All other labs looked good.

## 2022-11-10 NOTE — PROGRESS NOTES
Patient identified by two patient identifiers, name and date of birth. Spoke with patient. Patient aware of results and states understanding at this time. Patient stated she had not been taking the cholesterol medication daily. So stated, she will continue the same dosage daily and recheck on her next visit.

## 2022-11-15 DIAGNOSIS — E78.00 HYPERCHOLESTEROLEMIA: ICD-10-CM

## 2022-11-15 RX ORDER — ATORVASTATIN CALCIUM 20 MG/1
TABLET, FILM COATED ORAL
Qty: 90 TABLET | Refills: 4 | Status: SHIPPED | OUTPATIENT
Start: 2022-11-15

## 2022-12-05 ENCOUNTER — TELEPHONE (OUTPATIENT)
Dept: FAMILY MEDICINE CLINIC | Age: 62
End: 2022-12-05

## 2022-12-05 NOTE — TELEPHONE ENCOUNTER
Phone call made to [phone] (home)  no answer ,so a message was left asking patient to call office back at [phone] at their convenience.

## 2022-12-05 NOTE — TELEPHONE ENCOUNTER
LM for patient to give the office a call per Petey Shaffer.  Mailed lab results to patient per Petey Shaffer

## 2023-05-17 RX ORDER — ATORVASTATIN CALCIUM 20 MG/1
1 TABLET, FILM COATED ORAL DAILY
COMMUNITY
Start: 2022-11-15

## 2023-05-17 RX ORDER — HYDROCHLOROTHIAZIDE 12.5 MG/1
TABLET ORAL
COMMUNITY
Start: 2023-01-13

## 2023-05-17 RX ORDER — AMLODIPINE BESYLATE 5 MG/1
1 TABLET ORAL DAILY
COMMUNITY
Start: 2023-01-13

## 2023-11-13 ENCOUNTER — OFFICE VISIT (OUTPATIENT)
Age: 63
End: 2023-11-13
Payer: COMMERCIAL

## 2023-11-13 VITALS
WEIGHT: 168.8 LBS | HEIGHT: 64 IN | HEART RATE: 86 BPM | DIASTOLIC BLOOD PRESSURE: 80 MMHG | RESPIRATION RATE: 20 BRPM | SYSTOLIC BLOOD PRESSURE: 156 MMHG | TEMPERATURE: 97.8 F | OXYGEN SATURATION: 99 % | BODY MASS INDEX: 28.82 KG/M2

## 2023-11-13 DIAGNOSIS — J06.9 VIRAL URI: Primary | ICD-10-CM

## 2023-11-13 PROCEDURE — 3077F SYST BP >= 140 MM HG: CPT

## 2023-11-13 PROCEDURE — 3079F DIAST BP 80-89 MM HG: CPT

## 2023-11-13 PROCEDURE — 99213 OFFICE O/P EST LOW 20 MIN: CPT

## 2023-11-13 PROCEDURE — 1123F ACP DISCUSS/DSCN MKR DOCD: CPT

## 2023-11-13 RX ORDER — DEXTROMETHORPHAN HYDROBROMIDE AND PROMETHAZINE HYDROCHLORIDE 15; 6.25 MG/5ML; MG/5ML
5 SYRUP ORAL 4 TIMES DAILY PRN
Qty: 118 ML | Refills: 0 | Status: SHIPPED | OUTPATIENT
Start: 2023-11-13 | End: 2023-11-20

## 2023-11-13 SDOH — ECONOMIC STABILITY: FOOD INSECURITY: WITHIN THE PAST 12 MONTHS, THE FOOD YOU BOUGHT JUST DIDN'T LAST AND YOU DIDN'T HAVE MONEY TO GET MORE.: NEVER TRUE

## 2023-11-13 SDOH — ECONOMIC STABILITY: FOOD INSECURITY: WITHIN THE PAST 12 MONTHS, YOU WORRIED THAT YOUR FOOD WOULD RUN OUT BEFORE YOU GOT MONEY TO BUY MORE.: NEVER TRUE

## 2023-11-13 SDOH — ECONOMIC STABILITY: HOUSING INSECURITY
IN THE LAST 12 MONTHS, WAS THERE A TIME WHEN YOU DID NOT HAVE A STEADY PLACE TO SLEEP OR SLEPT IN A SHELTER (INCLUDING NOW)?: NO

## 2023-11-13 SDOH — ECONOMIC STABILITY: INCOME INSECURITY: HOW HARD IS IT FOR YOU TO PAY FOR THE VERY BASICS LIKE FOOD, HOUSING, MEDICAL CARE, AND HEATING?: NOT HARD AT ALL

## 2023-11-13 ASSESSMENT — ENCOUNTER SYMPTOMS
RHINORRHEA: 1
WHEEZING: 0
SHORTNESS OF BREATH: 0
COUGH: 1

## 2023-11-13 ASSESSMENT — PATIENT HEALTH QUESTIONNAIRE - PHQ9
SUM OF ALL RESPONSES TO PHQ QUESTIONS 1-9: 0
SUM OF ALL RESPONSES TO PHQ9 QUESTIONS 1 & 2: 0
SUM OF ALL RESPONSES TO PHQ QUESTIONS 1-9: 0
SUM OF ALL RESPONSES TO PHQ QUESTIONS 1-9: 0
1. LITTLE INTEREST OR PLEASURE IN DOING THINGS: 0
SUM OF ALL RESPONSES TO PHQ QUESTIONS 1-9: 0
2. FEELING DOWN, DEPRESSED OR HOPELESS: 0

## 2023-11-17 DIAGNOSIS — E78.00 PURE HYPERCHOLESTEROLEMIA, UNSPECIFIED: ICD-10-CM

## 2023-11-17 RX ORDER — ATORVASTATIN CALCIUM 20 MG/1
20 TABLET, FILM COATED ORAL DAILY
Qty: 90 TABLET | Refills: 0 | Status: SHIPPED | OUTPATIENT
Start: 2023-11-17 | End: 2024-01-08

## 2024-01-07 DIAGNOSIS — E78.00 PURE HYPERCHOLESTEROLEMIA, UNSPECIFIED: ICD-10-CM

## 2024-01-08 RX ORDER — AMLODIPINE BESYLATE 5 MG/1
5 TABLET ORAL DAILY
Qty: 90 TABLET | Refills: 1 | Status: SHIPPED | OUTPATIENT
Start: 2024-01-08

## 2024-01-08 RX ORDER — ATORVASTATIN CALCIUM 20 MG/1
20 TABLET, FILM COATED ORAL DAILY
Qty: 90 TABLET | Refills: 1 | Status: SHIPPED | OUTPATIENT
Start: 2024-01-08

## 2024-01-08 RX ORDER — HYDROCHLOROTHIAZIDE 12.5 MG/1
TABLET ORAL
Qty: 90 TABLET | Refills: 1 | Status: SHIPPED | OUTPATIENT
Start: 2024-01-08

## 2024-01-08 NOTE — TELEPHONE ENCOUNTER
Patient requesting refill on     Requested Prescriptions     Pending Prescriptions Disp Refills    amLODIPine (NORVASC) 5 MG tablet [Pharmacy Med Name: AMLODIPINE BESYLATE 5 MG TAB] 90 tablet 3     Sig: TAKE 1 TABLET BY MOUTH EVERY DAY    hydroCHLOROthiazide (HYDRODIURIL) 12.5 MG tablet [Pharmacy Med Name: HYDROCHLOROTHIAZIDE 12.5 MG TB] 90 tablet 3     Sig: TAKE 1 TABLET BY MOUTH EVERY DAY FOR BLOOD PRESSURE    atorvastatin (LIPITOR) 20 MG tablet [Pharmacy Med Name: ATORVASTATIN 20 MG TABLET] 90 tablet 0     Sig: TAKE 1 TABLET BY MOUTH EVERY DAY        Last OV 11/8/2022

## 2024-05-21 ENCOUNTER — OFFICE VISIT (OUTPATIENT)
Age: 64
End: 2024-05-21
Payer: COMMERCIAL

## 2024-05-21 VITALS
TEMPERATURE: 98.3 F | WEIGHT: 166.4 LBS | SYSTOLIC BLOOD PRESSURE: 140 MMHG | HEART RATE: 60 BPM | DIASTOLIC BLOOD PRESSURE: 80 MMHG | BODY MASS INDEX: 28.41 KG/M2 | HEIGHT: 64 IN | RESPIRATION RATE: 18 BRPM | OXYGEN SATURATION: 97 %

## 2024-05-21 DIAGNOSIS — Z23 IMMUNIZATION DUE: ICD-10-CM

## 2024-05-21 DIAGNOSIS — F41.9 ANXIETY: ICD-10-CM

## 2024-05-21 DIAGNOSIS — Z13.1 ENCOUNTER FOR SCREENING EXAMINATION FOR IMPAIRED GLUCOSE REGULATION AND DIABETES MELLITUS: ICD-10-CM

## 2024-05-21 DIAGNOSIS — Z00.00 WELLNESS EXAMINATION: Primary | ICD-10-CM

## 2024-05-21 DIAGNOSIS — E78.00 PURE HYPERCHOLESTEROLEMIA, UNSPECIFIED: ICD-10-CM

## 2024-05-21 DIAGNOSIS — Z12.31 SCREENING MAMMOGRAM FOR BREAST CANCER: ICD-10-CM

## 2024-05-21 DIAGNOSIS — I10 ESSENTIAL HYPERTENSION WITH GOAL BLOOD PRESSURE LESS THAN 140/90: ICD-10-CM

## 2024-05-21 DIAGNOSIS — E55.9 VITAMIN D DEFICIENCY: ICD-10-CM

## 2024-05-21 PROCEDURE — 90471 IMMUNIZATION ADMIN: CPT | Performed by: NURSE PRACTITIONER

## 2024-05-21 PROCEDURE — 3079F DIAST BP 80-89 MM HG: CPT | Performed by: NURSE PRACTITIONER

## 2024-05-21 PROCEDURE — 36415 COLL VENOUS BLD VENIPUNCTURE: CPT | Performed by: NURSE PRACTITIONER

## 2024-05-21 PROCEDURE — 90677 PCV20 VACCINE IM: CPT | Performed by: NURSE PRACTITIONER

## 2024-05-21 PROCEDURE — 99397 PER PM REEVAL EST PAT 65+ YR: CPT | Performed by: NURSE PRACTITIONER

## 2024-05-21 PROCEDURE — 3077F SYST BP >= 140 MM HG: CPT | Performed by: NURSE PRACTITIONER

## 2024-05-21 RX ORDER — ATORVASTATIN CALCIUM 20 MG/1
20 TABLET, FILM COATED ORAL DAILY
Qty: 90 TABLET | Refills: 3 | Status: SHIPPED | OUTPATIENT
Start: 2024-05-21

## 2024-05-21 RX ORDER — LORAZEPAM 1 MG/1
1 TABLET ORAL DAILY PRN
Qty: 30 TABLET | Refills: 0 | Status: SHIPPED | OUTPATIENT
Start: 2024-05-21 | End: 2024-06-20

## 2024-05-21 RX ORDER — HYDROCHLOROTHIAZIDE 12.5 MG/1
12.5 TABLET ORAL DAILY
Qty: 90 TABLET | Refills: 3 | Status: SHIPPED | OUTPATIENT
Start: 2024-05-21

## 2024-05-21 RX ORDER — AMLODIPINE BESYLATE 5 MG/1
5 TABLET ORAL DAILY
Qty: 90 TABLET | Refills: 3 | Status: SHIPPED | OUTPATIENT
Start: 2024-05-21

## 2024-05-21 SDOH — SOCIAL STABILITY: SOCIAL INSECURITY: WITHIN THE LAST YEAR, HAVE YOU BEEN AFRAID OF YOUR PARTNER OR EX-PARTNER?: NO

## 2024-05-21 SDOH — HEALTH STABILITY: MENTAL HEALTH: HOW OFTEN DO YOU HAVE A DRINK CONTAINING ALCOHOL?: NEVER

## 2024-05-21 SDOH — SOCIAL STABILITY: SOCIAL INSECURITY
WITHIN THE LAST YEAR, HAVE TO BEEN RAPED OR FORCED TO HAVE ANY KIND OF SEXUAL ACTIVITY BY YOUR PARTNER OR EX-PARTNER?: NO

## 2024-05-21 SDOH — SOCIAL STABILITY: SOCIAL NETWORK: ARE YOU MARRIED, WIDOWED, DIVORCED, SEPARATED, NEVER MARRIED, OR LIVING WITH A PARTNER?: NEVER MARRIED

## 2024-05-21 SDOH — SOCIAL STABILITY: SOCIAL INSECURITY
WITHIN THE LAST YEAR, HAVE YOU BEEN KICKED, HIT, SLAPPED, OR OTHERWISE PHYSICALLY HURT BY YOUR PARTNER OR EX-PARTNER?: NO

## 2024-05-21 SDOH — SOCIAL STABILITY: SOCIAL NETWORK: HOW OFTEN DO YOU ATTENT MEETINGS OF THE CLUB OR ORGANIZATION YOU BELONG TO?: MORE THAN 4 TIMES PER YEAR

## 2024-05-21 SDOH — ECONOMIC STABILITY: FOOD INSECURITY: WITHIN THE PAST 12 MONTHS, THE FOOD YOU BOUGHT JUST DIDN'T LAST AND YOU DIDN'T HAVE MONEY TO GET MORE.: NEVER TRUE

## 2024-05-21 SDOH — ECONOMIC STABILITY: INCOME INSECURITY: IN THE LAST 12 MONTHS, WAS THERE A TIME WHEN YOU WERE NOT ABLE TO PAY THE MORTGAGE OR RENT ON TIME?: NO

## 2024-05-21 SDOH — ECONOMIC STABILITY: TRANSPORTATION INSECURITY
IN THE PAST 12 MONTHS, HAS LACK OF TRANSPORTATION KEPT YOU FROM MEETINGS, WORK, OR FROM GETTING THINGS NEEDED FOR DAILY LIVING?: NO

## 2024-05-21 SDOH — ECONOMIC STABILITY: FOOD INSECURITY: WITHIN THE PAST 12 MONTHS, YOU WORRIED THAT YOUR FOOD WOULD RUN OUT BEFORE YOU GOT MONEY TO BUY MORE.: NEVER TRUE

## 2024-05-21 SDOH — SOCIAL STABILITY: SOCIAL NETWORK
DO YOU BELONG TO ANY CLUBS OR ORGANIZATIONS SUCH AS CHURCH GROUPS UNIONS, FRATERNAL OR ATHLETIC GROUPS, OR SCHOOL GROUPS?: YES

## 2024-05-21 SDOH — HEALTH STABILITY: PHYSICAL HEALTH: ON AVERAGE, HOW MANY MINUTES DO YOU ENGAGE IN EXERCISE AT THIS LEVEL?: 0 MIN

## 2024-05-21 SDOH — SOCIAL STABILITY: SOCIAL INSECURITY: WITHIN THE LAST YEAR, HAVE YOU BEEN HUMILIATED OR EMOTIONALLY ABUSED IN OTHER WAYS BY YOUR PARTNER OR EX-PARTNER?: NO

## 2024-05-21 SDOH — SOCIAL STABILITY: SOCIAL NETWORK: HOW OFTEN DO YOU GET TOGETHER WITH FRIENDS OR RELATIVES?: MORE THAN THREE TIMES A WEEK

## 2024-05-21 SDOH — HEALTH STABILITY: PHYSICAL HEALTH: ON AVERAGE, HOW MANY DAYS PER WEEK DO YOU ENGAGE IN MODERATE TO STRENUOUS EXERCISE (LIKE A BRISK WALK)?: 0 DAYS

## 2024-05-21 SDOH — ECONOMIC STABILITY: INCOME INSECURITY: HOW HARD IS IT FOR YOU TO PAY FOR THE VERY BASICS LIKE FOOD, HOUSING, MEDICAL CARE, AND HEATING?: NOT HARD AT ALL

## 2024-05-21 SDOH — HEALTH STABILITY: MENTAL HEALTH: HOW MANY STANDARD DRINKS CONTAINING ALCOHOL DO YOU HAVE ON A TYPICAL DAY?: PATIENT DOES NOT DRINK

## 2024-05-21 SDOH — HEALTH STABILITY: MENTAL HEALTH
STRESS IS WHEN SOMEONE FEELS TENSE, NERVOUS, ANXIOUS, OR CAN'T SLEEP AT NIGHT BECAUSE THEIR MIND IS TROUBLED. HOW STRESSED ARE YOU?: NOT AT ALL

## 2024-05-21 SDOH — SOCIAL STABILITY: SOCIAL NETWORK
IN A TYPICAL WEEK, HOW MANY TIMES DO YOU TALK ON THE PHONE WITH FAMILY, FRIENDS, OR NEIGHBORS?: MORE THAN THREE TIMES A WEEK

## 2024-05-21 SDOH — ECONOMIC STABILITY: TRANSPORTATION INSECURITY
IN THE PAST 12 MONTHS, HAS THE LACK OF TRANSPORTATION KEPT YOU FROM MEDICAL APPOINTMENTS OR FROM GETTING MEDICATIONS?: NO

## 2024-05-21 ASSESSMENT — PATIENT HEALTH QUESTIONNAIRE - PHQ9
2. FEELING DOWN, DEPRESSED OR HOPELESS: NOT AT ALL
SUM OF ALL RESPONSES TO PHQ9 QUESTIONS 1 & 2: 0
SUM OF ALL RESPONSES TO PHQ QUESTIONS 1-9: 0
SUM OF ALL RESPONSES TO PHQ QUESTIONS 1-9: 0
1. LITTLE INTEREST OR PLEASURE IN DOING THINGS: NOT AT ALL
SUM OF ALL RESPONSES TO PHQ QUESTIONS 1-9: 0
SUM OF ALL RESPONSES TO PHQ QUESTIONS 1-9: 0

## 2024-05-21 NOTE — PROGRESS NOTES
\"Have you been to the ER, urgent care clinic since your last visit?  Hospitalized since your last visit?\"    NO    “Have you seen or consulted any other health care providers outside of [facility] since your last visit?”    NO    Have you had a mammogram?”   NO    Date of last Mammogram: 8/13/2021             Click Here for Release of Records Request

## 2024-05-21 NOTE — PROGRESS NOTES
Chief Complaint   Patient presents with    Check-Up       ASSESSMENT AND PLAN:       1. Wellness examination    - LORazepam (ATIVAN) 1 MG tablet; Take 1 tablet by mouth daily as needed for Anxiety for up to 30 days. Max Daily Amount: 1 mg  Dispense: 30 tablet; Refill: 0  - amLODIPine (NORVASC) 5 MG tablet; Take 1 tablet by mouth daily  Dispense: 90 tablet; Refill: 3  - atorvastatin (LIPITOR) 20 MG tablet; Take 1 tablet by mouth daily  Dispense: 90 tablet; Refill: 3  - hydroCHLOROthiazide 12.5 MG tablet; Take 1 tablet by mouth daily for blood pressure  Dispense: 90 tablet; Refill: 3  - DC COLLECTION VENOUS BLOOD VENIPUNCTURE  - CBC with Auto Differential; Future  - Comprehensive Metabolic Panel; Future  - Lipid Panel; Future  - TSH; Future  - Vitamin D 25 Hydroxy; Future  - Hemoglobin A1C; Future  - [facility] SUBHASH DIGITAL SCREEN BILATERAL; Future  - Pneumococcal, PCV20, PREVNAR 20, (age 6w+), IM, PF    2. Essential hypertension with goal blood pressure less than 140/90    - amLODIPine (NORVASC) 5 MG tablet; Take 1 tablet by mouth daily  Dispense: 90 tablet; Refill: 3  - hydroCHLOROthiazide 12.5 MG tablet; Take 1 tablet by mouth daily for blood pressure  Dispense: 90 tablet; Refill: 3  - DC COLLECTION VENOUS BLOOD VENIPUNCTURE  - CBC with Auto Differential; Future  - Comprehensive Metabolic Panel; Future  - Lipid Panel; Future  - TSH; Future    3. Pure hypercholesterolemia, unspecified    - atorvastatin (LIPITOR) 20 MG tablet; Take 1 tablet by mouth daily  Dispense: 90 tablet; Refill: 3    4. Anxiety    - LORazepam (ATIVAN) 1 MG tablet; Take 1 tablet by mouth daily as needed for Anxiety for up to 30 days. Max Daily Amount: 1 mg  Dispense: 30 tablet; Refill: 0    5. Encounter for screening examination for impaired glucose regulation and diabetes mellitus    - Hemoglobin A1C; Future    6. Screening mammogram for breast cancer    - L[facility]Y SUBHASH DIGITAL SCREEN BILATERAL; Future    7. Immunization due    - Pneumococcal, PCV20,

## 2024-05-22 LAB
25(OH)D3 SERPL-MCNC: 30.1 NG/ML (ref 30–100)
ALBUMIN SERPL-MCNC: 3.7 G/DL (ref 3.5–5)
ALBUMIN/GLOB SERPL: 1.1 (ref 1.1–2.2)
ALP SERPL-CCNC: 74 U/L (ref 45–117)
ALT SERPL-CCNC: 17 U/L (ref 12–78)
ANION GAP SERPL CALC-SCNC: 4 MMOL/L (ref 5–15)
AST SERPL-CCNC: 11 U/L (ref 15–37)
BASOPHILS # BLD: 0 K/UL (ref 0–0.1)
BASOPHILS NFR BLD: 1 % (ref 0–1)
BILIRUB SERPL-MCNC: 1 MG/DL (ref 0.2–1)
BUN SERPL-MCNC: 15 MG/DL (ref 6–20)
BUN/CREAT SERPL: 21 (ref 12–20)
CALCIUM SERPL-MCNC: 9.5 MG/DL (ref 8.5–10.1)
CHLORIDE SERPL-SCNC: 108 MMOL/L (ref 97–108)
CHOLEST SERPL-MCNC: 167 MG/DL
CO2 SERPL-SCNC: 29 MMOL/L (ref 21–32)
CREAT SERPL-MCNC: 0.7 MG/DL (ref 0.55–1.02)
DIFFERENTIAL METHOD BLD: ABNORMAL
EOSINOPHIL # BLD: 0.1 K/UL (ref 0–0.4)
EOSINOPHIL NFR BLD: 4 % (ref 0–7)
ERYTHROCYTE [DISTWIDTH] IN BLOOD BY AUTOMATED COUNT: 14.6 % (ref 11.5–14.5)
EST. AVERAGE GLUCOSE BLD GHB EST-MCNC: 114 MG/DL
GLOBULIN SER CALC-MCNC: 3.4 G/DL (ref 2–4)
GLUCOSE SERPL-MCNC: 86 MG/DL (ref 65–100)
HBA1C MFR BLD: 5.6 % (ref 4–5.6)
HCT VFR BLD AUTO: 40.5 % (ref 35–47)
HDLC SERPL-MCNC: 68 MG/DL
HDLC SERPL: 2.5 (ref 0–5)
HGB BLD-MCNC: 12.4 G/DL (ref 11.5–16)
IMM GRANULOCYTES # BLD AUTO: 0 K/UL (ref 0–0.04)
IMM GRANULOCYTES NFR BLD AUTO: 0 % (ref 0–0.5)
LDLC SERPL CALC-MCNC: 84.8 MG/DL (ref 0–100)
LYMPHOCYTES # BLD: 1.1 K/UL (ref 0.8–3.5)
LYMPHOCYTES NFR BLD: 31 % (ref 12–49)
MCH RBC QN AUTO: 24.9 PG (ref 26–34)
MCHC RBC AUTO-ENTMCNC: 30.6 G/DL (ref 30–36.5)
MCV RBC AUTO: 81.3 FL (ref 80–99)
MONOCYTES # BLD: 0.5 K/UL (ref 0–1)
MONOCYTES NFR BLD: 13 % (ref 5–13)
NEUTS SEG # BLD: 1.9 K/UL (ref 1.8–8)
NEUTS SEG NFR BLD: 51 % (ref 32–75)
NRBC # BLD: 0 K/UL (ref 0–0.01)
NRBC BLD-RTO: 0 PER 100 WBC
PLATELET # BLD AUTO: 267 K/UL (ref 150–400)
PMV BLD AUTO: 10.1 FL (ref 8.9–12.9)
POTASSIUM SERPL-SCNC: 4.5 MMOL/L (ref 3.5–5.1)
PROT SERPL-MCNC: 7.1 G/DL (ref 6.4–8.2)
RBC # BLD AUTO: 4.98 M/UL (ref 3.8–5.2)
SODIUM SERPL-SCNC: 141 MMOL/L (ref 136–145)
TRIGL SERPL-MCNC: 71 MG/DL
TSH SERPL DL<=0.05 MIU/L-ACNC: 1.08 UIU/ML (ref 0.36–3.74)
VLDLC SERPL CALC-MCNC: 14.2 MG/DL
WBC # BLD AUTO: 3.7 K/UL (ref 3.6–11)

## 2024-05-22 NOTE — RESULT ENCOUNTER NOTE
Kidneys, liver, sugar are normal. Blood counts look good, no anemia. Vitamin D is perfect. Cholesterol levels are good. No med changes. I'll see her back in a year or so. Please remind her to schedule her mammogram.

## 2025-04-04 DIAGNOSIS — Z00.00 WELLNESS EXAMINATION: ICD-10-CM

## 2025-04-04 DIAGNOSIS — E78.00 PURE HYPERCHOLESTEROLEMIA, UNSPECIFIED: ICD-10-CM

## 2025-04-04 RX ORDER — ATORVASTATIN CALCIUM 20 MG/1
20 TABLET, FILM COATED ORAL DAILY
Qty: 90 TABLET | Refills: 3 | Status: SHIPPED | OUTPATIENT
Start: 2025-04-04

## 2025-06-24 ENCOUNTER — OFFICE VISIT (OUTPATIENT)
Age: 65
End: 2025-06-24
Payer: COMMERCIAL

## 2025-06-24 VITALS
HEART RATE: 75 BPM | HEIGHT: 64 IN | TEMPERATURE: 98.2 F | RESPIRATION RATE: 18 BRPM | SYSTOLIC BLOOD PRESSURE: 130 MMHG | OXYGEN SATURATION: 96 % | WEIGHT: 164.6 LBS | BODY MASS INDEX: 28.1 KG/M2 | DIASTOLIC BLOOD PRESSURE: 70 MMHG

## 2025-06-24 DIAGNOSIS — Z00.00 WELLNESS EXAMINATION: ICD-10-CM

## 2025-06-24 DIAGNOSIS — Z12.31 ENCOUNTER FOR SCREENING MAMMOGRAM FOR MALIGNANT NEOPLASM OF BREAST: ICD-10-CM

## 2025-06-24 DIAGNOSIS — E78.00 PURE HYPERCHOLESTEROLEMIA, UNSPECIFIED: ICD-10-CM

## 2025-06-24 DIAGNOSIS — Z13.220 SCREENING, LIPID: Primary | ICD-10-CM

## 2025-06-24 DIAGNOSIS — E55.9 VITAMIN D DEFICIENCY: ICD-10-CM

## 2025-06-24 DIAGNOSIS — I10 ESSENTIAL HYPERTENSION WITH GOAL BLOOD PRESSURE LESS THAN 140/90: ICD-10-CM

## 2025-06-24 DIAGNOSIS — Z13.1 ENCOUNTER FOR SCREENING EXAMINATION FOR IMPAIRED GLUCOSE REGULATION AND DIABETES MELLITUS: ICD-10-CM

## 2025-06-24 PROCEDURE — 36415 COLL VENOUS BLD VENIPUNCTURE: CPT | Performed by: NURSE PRACTITIONER

## 2025-06-24 PROCEDURE — 3078F DIAST BP <80 MM HG: CPT | Performed by: NURSE PRACTITIONER

## 2025-06-24 PROCEDURE — 3075F SYST BP GE 130 - 139MM HG: CPT | Performed by: NURSE PRACTITIONER

## 2025-06-24 PROCEDURE — 99397 PER PM REEVAL EST PAT 65+ YR: CPT | Performed by: NURSE PRACTITIONER

## 2025-06-24 SDOH — SOCIAL STABILITY: SOCIAL INSECURITY: WITHIN THE LAST YEAR, HAVE YOU BEEN AFRAID OF YOUR PARTNER OR EX-PARTNER?: NO

## 2025-06-24 SDOH — ECONOMIC STABILITY: FOOD INSECURITY: WITHIN THE PAST 12 MONTHS, YOU WORRIED THAT YOUR FOOD WOULD RUN OUT BEFORE YOU GOT THE MONEY TO BUY MORE.: NEVER TRUE

## 2025-06-24 SDOH — ECONOMIC STABILITY: HOUSING INSECURITY: IN THE LAST 12 MONTHS, WAS THERE A TIME WHEN YOU WERE NOT ABLE TO PAY THE MORTGAGE OR RENT ON TIME?: NO

## 2025-06-24 SDOH — HEALTH STABILITY: MENTAL HEALTH: HOW MANY DRINKS CONTAINING ALCOHOL DO YOU HAVE ON A TYPICAL DAY WHEN YOU ARE DRINKING?: PATIENT DOES NOT DRINK

## 2025-06-24 SDOH — HEALTH STABILITY: MENTAL HEALTH: HOW OFTEN DO YOU HAVE A DRINK CONTAINING ALCOHOL?: NEVER

## 2025-06-24 SDOH — SOCIAL STABILITY: SOCIAL NETWORK: HOW OFTEN DO YOU GET TOGETHER WITH FRIENDS OR RELATIVES?: MORE THAN THREE TIMES A WEEK

## 2025-06-24 SDOH — HEALTH STABILITY: MENTAL HEALTH
DO YOU FEEL STRESS - TENSE, RESTLESS, NERVOUS, OR ANXIOUS, OR UNABLE TO SLEEP AT NIGHT BECAUSE YOUR MIND IS TROUBLED ALL THE TIME - THESE DAYS?: ONLY A LITTLE

## 2025-06-24 SDOH — SOCIAL STABILITY: SOCIAL NETWORK
DO YOU BELONG TO ANY CLUBS OR ORGANIZATIONS SUCH AS CHURCH GROUPS, UNIONS, FRATERNAL OR ATHLETIC GROUPS, OR SCHOOL GROUPS?: NO

## 2025-06-24 SDOH — HEALTH STABILITY: PHYSICAL HEALTH: ON AVERAGE, HOW MANY DAYS PER WEEK DO YOU ENGAGE IN MODERATE TO STRENUOUS EXERCISE (LIKE A BRISK WALK)?: 7 DAYS

## 2025-06-24 SDOH — SOCIAL STABILITY: SOCIAL INSECURITY: WITHIN THE LAST YEAR, HAVE YOU BEEN HUMILIATED OR EMOTIONALLY ABUSED IN OTHER WAYS BY YOUR PARTNER OR EX-PARTNER?: NO

## 2025-06-24 SDOH — SOCIAL STABILITY: SOCIAL NETWORK: HOW OFTEN DO YOU ATTEND CHURCH OR RELIGIOUS SERVICES?: MORE THAN 4 TIMES PER YEAR

## 2025-06-24 SDOH — SOCIAL STABILITY: SOCIAL INSECURITY: ARE YOU MARRIED, WIDOWED, DIVORCED, SEPARATED, NEVER MARRIED, OR LIVING WITH A PARTNER?: NEVER MARRIED

## 2025-06-24 SDOH — SOCIAL STABILITY: SOCIAL NETWORK: HOW OFTEN DO YOU ATTEND MEETINGS OF THE CLUBS OR ORGANIZATIONS YOU BELONG TO?: MORE THAN 4 TIMES PER YEAR

## 2025-06-24 SDOH — SOCIAL STABILITY: SOCIAL INSECURITY
WITHIN THE LAST YEAR, HAVE YOU BEEN RAPED OR FORCED TO HAVE ANY KIND OF SEXUAL ACTIVITY BY YOUR PARTNER OR EX-PARTNER?: NO

## 2025-06-24 SDOH — HEALTH STABILITY: PHYSICAL HEALTH: ON AVERAGE, HOW MANY MINUTES DO YOU ENGAGE IN EXERCISE AT THIS LEVEL?: 120 MIN

## 2025-06-24 SDOH — ECONOMIC STABILITY: FOOD INSECURITY: WITHIN THE PAST 12 MONTHS, THE FOOD YOU BOUGHT JUST DIDN'T LAST AND YOU DIDN'T HAVE MONEY TO GET MORE.: NEVER TRUE

## 2025-06-24 SDOH — ECONOMIC STABILITY: FOOD INSECURITY: HOW HARD IS IT FOR YOU TO PAY FOR THE VERY BASICS LIKE FOOD, HOUSING, MEDICAL CARE, AND HEATING?: NOT HARD AT ALL

## 2025-06-24 SDOH — ECONOMIC STABILITY: TRANSPORTATION INSECURITY: IN THE PAST 12 MONTHS, HAS LACK OF TRANSPORTATION KEPT YOU FROM MEDICAL APPOINTMENTS OR FROM GETTING MEDICATIONS?: NO

## 2025-06-24 ASSESSMENT — PATIENT HEALTH QUESTIONNAIRE - PHQ9
1. LITTLE INTEREST OR PLEASURE IN DOING THINGS: NOT AT ALL
SUM OF ALL RESPONSES TO PHQ QUESTIONS 1-9: 0
SUM OF ALL RESPONSES TO PHQ QUESTIONS 1-9: 0
2. FEELING DOWN, DEPRESSED OR HOPELESS: NOT AT ALL
SUM OF ALL RESPONSES TO PHQ QUESTIONS 1-9: 0
SUM OF ALL RESPONSES TO PHQ QUESTIONS 1-9: 0

## 2025-06-24 ASSESSMENT — ACTIVITIES OF DAILY LIVING (ADL): LACK_OF_TRANSPORTATION: NO

## 2025-06-24 NOTE — PROGRESS NOTES
Chief Complaint   Patient presents with    Annual Exam       ASSESSMENT AND PLAN:       1. Wellness examination    - [facility] SUBHASH DIGITAL SCREEN BILATERAL; Future  - COLLECTION VENOUS BLOOD,VENIPUNCTURE  - CBC with Auto Differential; Future  - Comprehensive Metabolic Panel; Future  - Hemoglobin A1C; Future  - Vitamin D 25 Hydroxy; Future  - TSH; Future  - Lipid Panel; Future  - Lipid Panel  - TSH  - Vitamin D 25 Hydroxy  - Hemoglobin A1C  - Comprehensive Metabolic Panel  - CBC with Auto Differential    2. Essential hypertension with goal blood pressure less than 140/90    - COLLECTION VENOUS BLOOD,VENIPUNCTURE  - CBC with Auto Differential; Future  - Comprehensive Metabolic Panel; Future  - TSH; Future  - TSH  - Comprehensive Metabolic Panel  - CBC with Auto Differential    3. Pure hypercholesterolemia, unspecified      4. Screening, lipid    - COLLECTION VENOUS BLOOD,VENIPUNCTURE  - Lipid Panel; Future  - Lipid Panel    5. Vitamin D deficiency    - COLLECTION VENOUS BLOOD,VENIPUNCTURE  - Vitamin D 25 Hydroxy; Future  - Vitamin D 25 Hydroxy    6. Encounter for screening examination for impaired glucose regulation and diabetes mellitus    - COLLECTION VENOUS BLOOD,VENIPUNCTURE  - Hemoglobin A1C; Future  - Hemoglobin A1C    7. Encounter for screening mammogram for malignant neoplasm of breast    - [facility] SUBHASH DIGITAL SCREEN BILATERAL; Future     Assessment & Plan  1. Health maintenance: Stable.  - Comprehensive blood panel today: cholesterol, blood sugar, thyroid function, vitamin D levels, liver and kidney function, electrolytes.  - Updated mammogram order to be placed, slip provided.    2. Epidermal cyst: Chronic.  - Numbing and removal in office with possible stitches.  - Advised not to drain at home, leave intact for easier removal.  - 40-minute appointment to be scheduled.    3. Hypertension: Stable.  - BP well-regulated at 130/70.    4. Hyperlipidemia: Stable.  - Continues cholesterol medication without adverse

## 2025-06-24 NOTE — PROGRESS NOTES
Have you been to the ER, urgent care clinic since your last visit?  Hospitalized since your last visit?   NO    Have you seen or consulted any other health care providers outside our system since your last visit?   NO    Have you had a mammogram?”   NO    Date of last Mammogram: 8/13/2021

## 2025-06-25 LAB
25(OH)D3 SERPL-MCNC: 45.9 NG/ML (ref 30–100)
ALBUMIN SERPL-MCNC: 3.9 G/DL (ref 3.5–5)
ALBUMIN/GLOB SERPL: 1.1 (ref 1.1–2.2)
ALP SERPL-CCNC: 82 U/L (ref 45–117)
ALT SERPL-CCNC: 18 U/L (ref 12–78)
ANION GAP SERPL CALC-SCNC: 6 MMOL/L (ref 2–12)
AST SERPL-CCNC: 8 U/L (ref 15–37)
BASOPHILS # BLD: 0.03 K/UL (ref 0–0.1)
BASOPHILS NFR BLD: 1 % (ref 0–1)
BILIRUB SERPL-MCNC: 1 MG/DL (ref 0.2–1)
BUN SERPL-MCNC: 14 MG/DL (ref 6–20)
BUN/CREAT SERPL: 21 (ref 12–20)
CALCIUM SERPL-MCNC: 9.9 MG/DL (ref 8.5–10.1)
CHLORIDE SERPL-SCNC: 107 MMOL/L (ref 97–108)
CHOLEST SERPL-MCNC: 159 MG/DL
CO2 SERPL-SCNC: 27 MMOL/L (ref 21–32)
CREAT SERPL-MCNC: 0.67 MG/DL (ref 0.55–1.02)
DIFFERENTIAL METHOD BLD: ABNORMAL
EOSINOPHIL # BLD: 0.1 K/UL (ref 0–0.4)
EOSINOPHIL NFR BLD: 3.2 % (ref 0–7)
ERYTHROCYTE [DISTWIDTH] IN BLOOD BY AUTOMATED COUNT: 14.4 % (ref 11.5–14.5)
EST. AVERAGE GLUCOSE BLD GHB EST-MCNC: 123 MG/DL
GLOBULIN SER CALC-MCNC: 3.6 G/DL (ref 2–4)
GLUCOSE SERPL-MCNC: 87 MG/DL (ref 65–100)
HBA1C MFR BLD: 5.9 % (ref 4–5.6)
HCT VFR BLD AUTO: 43.3 % (ref 35–47)
HDLC SERPL-MCNC: 69 MG/DL
HDLC SERPL: 2.3 (ref 0–5)
HGB BLD-MCNC: 12.6 G/DL (ref 11.5–16)
IMM GRANULOCYTES # BLD AUTO: 0 K/UL (ref 0–0.04)
IMM GRANULOCYTES NFR BLD AUTO: 0 % (ref 0–0.5)
LDLC SERPL CALC-MCNC: 75.4 MG/DL (ref 0–100)
LYMPHOCYTES # BLD: 0.74 K/UL (ref 0.8–3.5)
LYMPHOCYTES NFR BLD: 23.9 % (ref 12–49)
MCH RBC QN AUTO: 24.9 PG (ref 26–34)
MCHC RBC AUTO-ENTMCNC: 29.1 G/DL (ref 30–36.5)
MCV RBC AUTO: 85.6 FL (ref 80–99)
MONOCYTES # BLD: 0.44 K/UL (ref 0–1)
MONOCYTES NFR BLD: 14.3 % (ref 5–13)
NEUTS SEG # BLD: 1.79 K/UL (ref 1.8–8)
NEUTS SEG NFR BLD: 57.6 % (ref 32–75)
NRBC # BLD: 0 K/UL (ref 0–0.01)
NRBC BLD-RTO: 0 PER 100 WBC
PLATELET # BLD AUTO: 272 K/UL (ref 150–400)
PMV BLD AUTO: 10.1 FL (ref 8.9–12.9)
POTASSIUM SERPL-SCNC: 3.8 MMOL/L (ref 3.5–5.1)
PROT SERPL-MCNC: 7.5 G/DL (ref 6.4–8.2)
RBC # BLD AUTO: 5.06 M/UL (ref 3.8–5.2)
RBC MORPH BLD: ABNORMAL
SODIUM SERPL-SCNC: 140 MMOL/L (ref 136–145)
TRIGL SERPL-MCNC: 73 MG/DL
TSH SERPL DL<=0.05 MIU/L-ACNC: 1 UIU/ML (ref 0.36–3.74)
VLDLC SERPL CALC-MCNC: 14.6 MG/DL
WBC # BLD AUTO: 3.1 K/UL (ref 3.6–11)

## 2025-06-26 ENCOUNTER — RESULTS FOLLOW-UP (OUTPATIENT)
Age: 65
End: 2025-06-26

## 2025-07-04 DIAGNOSIS — Z00.00 WELLNESS EXAMINATION: ICD-10-CM

## 2025-07-04 DIAGNOSIS — I10 ESSENTIAL HYPERTENSION WITH GOAL BLOOD PRESSURE LESS THAN 140/90: ICD-10-CM

## 2025-07-07 DIAGNOSIS — I10 ESSENTIAL HYPERTENSION WITH GOAL BLOOD PRESSURE LESS THAN 140/90: ICD-10-CM

## 2025-07-07 DIAGNOSIS — Z00.00 WELLNESS EXAMINATION: ICD-10-CM

## 2025-07-07 RX ORDER — HYDROCHLOROTHIAZIDE 12.5 MG/1
12.5 TABLET ORAL DAILY
Qty: 90 TABLET | Refills: 3 | Status: SHIPPED | OUTPATIENT
Start: 2025-07-07

## 2025-07-07 RX ORDER — AMLODIPINE BESYLATE 5 MG/1
5 TABLET ORAL DAILY
Qty: 90 TABLET | Refills: 3 | OUTPATIENT
Start: 2025-07-07

## 2025-07-07 RX ORDER — HYDROCHLOROTHIAZIDE 12.5 MG/1
12.5 TABLET ORAL DAILY
Qty: 90 TABLET | Refills: 3 | OUTPATIENT
Start: 2025-07-07

## 2025-07-07 RX ORDER — AMLODIPINE BESYLATE 5 MG/1
5 TABLET ORAL DAILY
Qty: 90 TABLET | Refills: 3 | Status: SHIPPED | OUTPATIENT
Start: 2025-07-07

## 2025-07-07 NOTE — TELEPHONE ENCOUNTER
Pt called wanting a refill on Amlodipine and HCTZ sent to [facility], please advise.

## 2025-07-08 ENCOUNTER — OFFICE VISIT (OUTPATIENT)
Age: 65
End: 2025-07-08
Payer: COMMERCIAL

## 2025-07-08 VITALS
BODY MASS INDEX: 28.58 KG/M2 | HEART RATE: 91 BPM | DIASTOLIC BLOOD PRESSURE: 78 MMHG | SYSTOLIC BLOOD PRESSURE: 140 MMHG | OXYGEN SATURATION: 99 % | HEIGHT: 64 IN | WEIGHT: 167.4 LBS | TEMPERATURE: 97.7 F | RESPIRATION RATE: 20 BRPM

## 2025-07-08 DIAGNOSIS — L72.3 SEBACEOUS CYST: Primary | ICD-10-CM

## 2025-07-08 PROCEDURE — NBSRV NON-BILLABLE SERVICE: Performed by: NURSE PRACTITIONER

## 2025-07-08 PROCEDURE — 11402 EXC TR-EXT B9+MARG 1.1-2 CM: CPT | Performed by: NURSE PRACTITIONER

## 2025-07-08 RX ORDER — LIDOCAINE HYDROCHLORIDE AND EPINEPHRINE 10; 10 MG/ML; UG/ML
3 INJECTION, SOLUTION INFILTRATION; PERINEURAL ONCE
Status: COMPLETED | OUTPATIENT
Start: 2025-07-08 | End: 2025-07-08

## 2025-07-08 RX ADMIN — LIDOCAINE HYDROCHLORIDE AND EPINEPHRINE 3 ML: 10; 10 INJECTION, SOLUTION INFILTRATION; PERINEURAL at 15:20

## 2025-07-08 ASSESSMENT — PATIENT HEALTH QUESTIONNAIRE - PHQ9
1. LITTLE INTEREST OR PLEASURE IN DOING THINGS: NOT AT ALL
SUM OF ALL RESPONSES TO PHQ QUESTIONS 1-9: 0
2. FEELING DOWN, DEPRESSED OR HOPELESS: NOT AT ALL
SUM OF ALL RESPONSES TO PHQ QUESTIONS 1-9: 0

## 2025-07-08 NOTE — PROGRESS NOTES
\  Have you been to the ER, urgent care clinic since your last visit?  Hospitalized since your last visit?   NO    Have you seen or consulted any other health care providers outside our system since your last visit?   NO

## 2025-07-09 NOTE — PROGRESS NOTES
Chief Complaint   Patient presents with    Cyst       ASSESSMENT AND PLAN:       1. Sebaceous cyst    - lidocaine-EPINEPHrine 1 %-1:[id number] injection 3 mL     Cyst removed in its entirety. Wound flushed and 2 simple interrupted sutures placed. Wound care instructions reviewed.     Patient aware of plan of care and verbalized understanding. Questions answered. RTC 1 week for suture removal, or sooner if needed.    HPI:     is a 65 y.o. female in today for an acute visit with a CC of a lesion to her L shoulder x several years, increasing in size. Hx of abscess drainage years ago in the same area per patient. She used to be able to get purulent material out of the wound but she hasn't been able to do so recently.    Allergies   Allergen Reactions    Amoxicillin Other (See Comments)     Pt wont take it because of family members being allergic to it       Prior to Visit Medications    Medication Sig Taking? Authorizing Provider   amLODIPine (NORVASC) 5 MG tablet Take 1 tablet by mouth daily Yes Shanell Mera APRN - NP   hydroCHLOROthiazide 12.5 MG tablet Take 1 tablet by mouth daily for blood pressure Yes Shanell Mera, APRN - NP   atorvastatin (LIPITOR) 20 MG tablet TAKE 1 TABLET BY MOUTH EVERY DAY Yes Shanell Mera, APRN - NP       Past Medical History:   Diagnosis Date    Allergic rhinitis     Anemia     blood loss    Hypercholesterolemia     Hypertension     Irregular menses     Menopause        Family History   Problem Relation Age of Onset    Cancer Paternal Grandmother         Lung--smoker    Diabetes Father     Arrhythmia Father         A fib    Hypertension Sister     Hypertension Brother     Diabetes Maternal Grandmother     Diabetes Brother     Breast Cancer Maternal Aunt     Hypertension Father     Stroke Father     Diabetes Mother     Hypertension Mother          Review of Systems    A comprehensive review of systems was negative except for that written in the HPI.    Patient is

## 2025-07-15 ENCOUNTER — CLINICAL SUPPORT (OUTPATIENT)
Age: 65
End: 2025-07-15